# Patient Record
Sex: FEMALE | Race: WHITE | NOT HISPANIC OR LATINO | Employment: FULL TIME | URBAN - METROPOLITAN AREA
[De-identification: names, ages, dates, MRNs, and addresses within clinical notes are randomized per-mention and may not be internally consistent; named-entity substitution may affect disease eponyms.]

---

## 2017-03-15 RX ORDER — DESOGESTREL AND ETHINYL ESTRADIOL AND ETHINYL ESTRADIOL 21-5 (28)
KIT ORAL
Qty: 28 TABLET | Refills: 10 | Status: SHIPPED | OUTPATIENT
Start: 2017-03-15 | End: 2017-04-14 | Stop reason: SDUPTHER

## 2017-04-14 ENCOUNTER — OFFICE VISIT (OUTPATIENT)
Dept: OBSTETRICS AND GYNECOLOGY | Age: 27
End: 2017-04-14

## 2017-04-14 VITALS
WEIGHT: 102 LBS | SYSTOLIC BLOOD PRESSURE: 100 MMHG | BODY MASS INDEX: 18.77 KG/M2 | DIASTOLIC BLOOD PRESSURE: 60 MMHG | HEIGHT: 62 IN

## 2017-04-14 DIAGNOSIS — Z01.419 ENCOUNTER FOR GYNECOLOGICAL EXAMINATION WITHOUT ABNORMAL FINDING: Primary | ICD-10-CM

## 2017-04-14 DIAGNOSIS — B96.89 BV (BACTERIAL VAGINOSIS): ICD-10-CM

## 2017-04-14 DIAGNOSIS — N76.0 BV (BACTERIAL VAGINOSIS): ICD-10-CM

## 2017-04-14 PROCEDURE — 99395 PREV VISIT EST AGE 18-39: CPT | Performed by: OBSTETRICS & GYNECOLOGY

## 2017-04-14 RX ORDER — METRONIDAZOLE 7.5 MG/G
GEL VAGINAL WEEKLY
Qty: 70 G | Refills: 2 | Status: SHIPPED | OUTPATIENT
Start: 2017-04-14 | End: 2018-03-16 | Stop reason: SDUPTHER

## 2017-04-14 RX ORDER — DESOGESTREL AND ETHINYL ESTRADIOL 21-5 (28)
1 KIT ORAL DAILY
Qty: 28 TABLET | Refills: 12 | Status: SHIPPED | OUTPATIENT
Start: 2017-04-14 | End: 2018-09-25

## 2017-04-14 RX ORDER — TINIDAZOLE 500 MG/1
500 TABLET ORAL 2 TIMES DAILY
Qty: 10 TABLET | Refills: 1 | Status: SHIPPED | OUTPATIENT
Start: 2017-04-14 | End: 2017-04-19

## 2017-04-14 NOTE — PROGRESS NOTES
Routine Annual Visit    2017    Patient: Dina Chacon          MR#:[id number]      Chief Complaint   Patient presents with   • Annual Exam     PT HERE FOR ROUTINE ANNUAL CHECK, DOING WELL. LAST PAP  (NEG AND NEG HPV).       History of Present Illness    26 y.o. female  who presents for annual exam.   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  Obstetric History:  OB History      Para Term  AB TAB SAB Ectopic Multiple Living    3 2 2       1         Menstrual History:     Patient's last menstrual period was 2017 (exact date).       Sexual History:       ________________________________________  There is no problem list on file for this patient.      History reviewed. No pertinent past medical history.    Past Surgical History:   Procedure Laterality Date   • BREAST AUGMENTATION      enlargement   • BREAST SURGERY      augmentation   •  SECTION     • COLPOSCOPY     • LEEP     • NOSE SURGERY     • OTHER SURGICAL HISTORY      nasal septal deviation repair       History   Smoking Status   • Current Every Day Smoker   Smokeless Tobacco   • Not on file       has a current medication list which includes the following prescription(s): acetaminophen, cephalexin, desogestrel-ethinyl estradiol, metronidazole, and tinidazole.  ________________________________________    Current contraception: vasectomy  History of abnormal Pap smear: yes - last pap neg/neg  Family history of Breast cancer: no  Family history of uterine or ovarian cancer: no  Family History of colon cancer/colon polyps: no  History of abnormal mammogram: no      The following portions of the patient's history were reviewed and updated as appropriate: allergies,  "current medications, past family history, past medical history, past social history, past surgical history and problem list.    Review of Systems    Pertinent items are noted in HPI.     Objective   Physical Exam    /60  Ht 62\" (157.5 cm)  Wt 102 lb (46.3 kg)  LMP 03/30/2017 (Exact Date)  BMI 18.66 kg/m2   BP Readings from Last 3 Encounters:   04/14/17 100/60      Wt Readings from Last 3 Encounters:   04/14/17 102 lb (46.3 kg)      BMI: Estimated body mass index is 18.66 kg/(m^2) as calculated from the following:    Height as of this encounter: 62\" (157.5 cm).    Weight as of this encounter: 102 lb (46.3 kg).      General:   alert, appears stated age and cooperative   Abdomen: soft, non-tender, without masses or organomegaly   Breast: inspection negative, no nipple discharge or bleeding, no masses or nodularity palpable   Vulva: normal   Vagina: normal mucosa, minimal discharge with no odor noted   Cervix: no cervical motion tenderness and no lesions   Uterus: normal size, mobile or non-tender   Adnexa: normal adnexa and no mass, fullness, tenderness     Assessment:    1. Normal annual exam   Assessment     ICD-10-CM ICD-9-CM   1. Encounter for gynecological examination without abnormal finding Z01.419 V72.31   2. BV (bacterial vaginosis) N76.0 616.10    B96.89 041.9     Plan:    Plan     []  Mammogram request made  []  PAP done  []  Labs:   []  GC/Chl/TV  []  DEXA scan   []  Referral for colonoscopy:       Dina was seen today for annual exam.    Diagnoses and all orders for this visit:    Encounter for gynecological examination without abnormal finding    BV (bacterial vaginosis)  -     tinidazole (TINDAMAX) 500 MG tablet; Take 1 tablet by mouth 2 (Two) Times a Day for 5 days.  -     metroNIDAZOLE (METROGEL VAGINAL) 0.75 % vaginal gel; Insert  into the vagina 1 (One) Time Per Week. Use 1 applicator weekly for maintenance  -     NuSwab VG+    Other orders  -     desogestrel-ethinyl estradiol (VIORELE) " 0.15-0.02/0.01 MG (21/5) per tablet; Take 1 tablet by mouth Daily.

## 2017-04-19 LAB
A VAGINAE DNA VAG QL NAA+PROBE: ABNORMAL SCORE
BVAB2 DNA VAG QL NAA+PROBE: ABNORMAL SCORE
C ALBICANS DNA VAG QL NAA+PROBE: NEGATIVE
C GLABRATA DNA VAG QL NAA+PROBE: NEGATIVE
C TRACH RRNA SPEC QL NAA+PROBE: NEGATIVE
MEGA1 DNA VAG QL NAA+PROBE: ABNORMAL SCORE
N GONORRHOEA RRNA SPEC QL NAA+PROBE: NEGATIVE
T VAGINALIS RRNA SPEC QL NAA+PROBE: NEGATIVE

## 2017-04-24 ENCOUNTER — OFFICE VISIT (OUTPATIENT)
Dept: ORTHOPEDIC SURGERY | Facility: CLINIC | Age: 27
End: 2017-04-24

## 2017-04-24 VITALS — HEIGHT: 62 IN | TEMPERATURE: 99 F | BODY MASS INDEX: 18.95 KG/M2 | WEIGHT: 103 LBS

## 2017-04-24 DIAGNOSIS — M79.675 PAIN OF TOE OF LEFT FOOT: ICD-10-CM

## 2017-04-24 DIAGNOSIS — M77.52 BURSITIS OF LEFT FOOT: Primary | ICD-10-CM

## 2017-04-24 PROCEDURE — 73630 X-RAY EXAM OF FOOT: CPT | Performed by: ORTHOPAEDIC SURGERY

## 2017-04-24 PROCEDURE — 99203 OFFICE O/P NEW LOW 30 MIN: CPT | Performed by: ORTHOPAEDIC SURGERY

## 2017-04-24 NOTE — PROGRESS NOTES
"Patient:  Dina Chacon is a 26 y.o. female    Chief Complaint/ Reason for Visit:    Chief Complaint   Patient presents with   • Left Foot - Pain, Edema, Establish Care       HPI:  The patient presents today complaining of a painful \"bump\" on the lateral aspect of her left forefoot.  It has been present now for about 4 months.  The pain increases in response to wearing high-heeled dress shoes, and also throughout the day with weightbearing activities.  She does not recall any injury or trauma.  She is having pain that radiates from this area up on the dorsum of her left foot on the anterior lower left leg.  She is not had any discoloration or bruising.  She says that she has moderate pain on a constant basis in this area laterally.  She will episodically have a sharp, stabbing pain in this area.  Most of the time, the pain is aching.  It is alleviated by rest, taking her shoes off, and propping her feet up.  She has 3 relatively young children, and in addition to being employed full time, rarely has time to rest due to her home and work responsibilities.  She has no history of previously injuring the left foot.  She has no complaints with her right foot.      PMH:    Past Medical History:   Diagnosis Date   • Anemia during pregnancy    • Head ache    • Pain in joint, foot, left        PSH:    Past Surgical History:   Procedure Laterality Date   • BREAST AUGMENTATION      enlargement   • BREAST SURGERY      augmentation   •  SECTION     • COLPOSCOPY  2014   • LEEP  2014   • NOSE SURGERY     • OTHER SURGICAL HISTORY      nasal septal deviation repair       Social Hx:    Social History     Social History   • Marital status:      Spouse name: N/A   • Number of children: N/A   • Years of education: N/A     Occupational History   • Not on file.     Social History Main Topics   • Smoking status: Current Every Day Smoker     Packs/day: 0.50   • Smokeless tobacco: Not on file   • Alcohol use Yes      " "Comment: social drinker   • Drug use: Defer   • Sexual activity: Defer     Other Topics Concern   • Not on file     Social History Narrative       Family Hx:    Family History   Problem Relation Age of Onset   • Multiple sclerosis Other        Meds:    Current Outpatient Prescriptions:   •  acetaminophen (TYLENOL) 325 MG tablet, Take  by mouth., Disp: , Rfl:   •  Cephalexin (KEFLEX PO), Take  by mouth., Disp: , Rfl:   •  desogestrel-ethinyl estradiol (VIORELE) 0.15-0.02/0.01 MG (21/5) per tablet, Take 1 tablet by mouth Daily., Disp: 28 tablet, Rfl: 12  •  metroNIDAZOLE (METROGEL VAGINAL) 0.75 % vaginal gel, Insert  into the vagina 1 (One) Time Per Week. Use 1 applicator weekly for maintenance, Disp: 70 g, Rfl: 2    Allergies:    Allergies   Allergen Reactions   • Gelatin    • Sulfa Antibiotics Hives       ROS:  Review of Systems   Constitutional: Negative for chills, fever and unexpected weight change.   HENT: Negative for trouble swallowing and voice change.    Eyes: Negative for visual disturbance.   Respiratory: Negative for cough and shortness of breath.    Cardiovascular: Negative for chest pain and leg swelling.   Gastrointestinal: Negative for abdominal pain, nausea and vomiting.   Endocrine: Negative for cold intolerance and heat intolerance.   Genitourinary: Negative for difficulty urinating, frequency and urgency.   Musculoskeletal: Positive for arthralgias and joint swelling.   Skin: Negative for rash and wound.   Allergic/Immunologic: Negative for immunocompromised state.   Neurological: Positive for headaches. Negative for weakness and numbness.   Hematological: Does not bruise/bleed easily.   Psychiatric/Behavioral: Negative for dysphoric mood. The patient is not nervous/anxious.        Vitals:    04/24/17 0914   Temp: 99 °F (37.2 °C)   TempSrc: Temporal Artery    Weight: 103 lb (46.7 kg)   Height: 62\" (157.5 cm)       Physical Exam    The patient is awake, alert, and oriented ×3.  The patient is in no " acute distress.  Breathing is regular and unlabored with a respiratory rate of 14/m.  Extraocular movements and pupillary responses are symmetrically intact. Sclerae are anicteric.   Hearing is within normal limits.  Speech is within normal limits.  There is no jugular venous distention.    She's walking fairly well with a smooth symmetrical heel toe gait.  On inspection of her feet, she definitely has some fullness and enlargement over the lateral left forefoot surrounding the fifth metatarsal phalangeal joint.  She has some plantar callusing here, and also has plantar callusing beneath the first metatarsal head medially.  She has similar callused areas in her right forefoot as well.  She has tenderness over the lateral left forefoot over the fifth metatarsal head.  There is no cellulitis, no lymphangitis, and there is no popliteal lymphadenopathy.  She has strong active dorsiflexion and plantarflexion, inversion and eversion of the ankle foot and toes in the left lower extremity.  Her left calf is soft and nontender with no venous cord.  She has 1+ pedal pulses present symmetrically in both feet with a current, regular heart rate of about 78 bpm.  Skin and nails are unremarkable.  She has no tenderness anywhere in the left foot or ankle other than overlying the fifth metatarsal head laterally.  There is slight fluctuance here consistent with a bursa.        Radiology:X-rays: 3 views of the patient's left foot were ordered and reviewed today to assess her complaints of left lateral forefoot pain and swelling.  These images reveal soft tissue swelling only.  I do not see any bony or articular abnormalities with particular attention to the area of swelling in the left fifth metatarsal phalangeal joint.  I see no bony abnormalities elsewhere throughout the left foot.  Comparison images were not available.          Assessment:  1. Bursitis of left foot    - XR Foot 3+ View Left    2. Pain of toe of left  foot          Plan:  I discussed everything with the patient length.  We discussed the likelihood that this had all arisen from a single    Confining footwear and repeated pressure and confinement of the forefoot in this area.  There isn't really been no opportunity for her to alleviate pressure on this area.  I think she needs to avoid confining shoes, avoid high heels, and wear going to try a silicone pad.  I also think Achilles tendon stretching is important for both of her feet, and we discussed this as well.  She voiced understanding.  She was taught proper technique and program for Achilles tendon stretching, and we went over other footwear can serrations, relief padding, etc. and she voiced understanding.      Orders Placed This Encounter   Procedures   • XR Foot 3+ View Left     Order Specific Question:   Reason for Exam:     Answer:   iov lt. foot     Order Specific Question:   Patient Pregnant     Answer:   No

## 2017-07-06 ENCOUNTER — HOSPITAL ENCOUNTER (EMERGENCY)
Facility: HOSPITAL | Age: 27
Discharge: HOME | End: 2017-07-06
Attending: EMERGENCY MEDICINE | Admitting: EMERGENCY MEDICINE

## 2017-07-06 VITALS
SYSTOLIC BLOOD PRESSURE: 99 MMHG | BODY MASS INDEX: 18.4 KG/M2 | HEIGHT: 62 IN | DIASTOLIC BLOOD PRESSURE: 53 MMHG | HEART RATE: 93 BPM | OXYGEN SATURATION: 98 % | TEMPERATURE: 98 F | WEIGHT: 100 LBS | RESPIRATION RATE: 18 BRPM

## 2017-07-06 DIAGNOSIS — R11.2 NON-INTRACTABLE VOMITING WITH NAUSEA, UNSPECIFIED VOMITING TYPE: ICD-10-CM

## 2017-07-06 DIAGNOSIS — R51.9 HEADACHE, UNSPECIFIED HEADACHE TYPE: Primary | ICD-10-CM

## 2017-07-06 LAB
ALBUMIN SERPL-MCNC: 4.3 G/DL (ref 3.5–5.2)
ALBUMIN/GLOB SERPL: 1.6 G/DL
ALP SERPL-CCNC: 58 U/L (ref 39–117)
ALT SERPL W P-5'-P-CCNC: 80 U/L (ref 1–33)
ANION GAP SERPL CALCULATED.3IONS-SCNC: 16.3 MMOL/L
AST SERPL-CCNC: 53 U/L (ref 1–32)
BACTERIA UR QL AUTO: ABNORMAL /HPF
BASOPHILS # BLD AUTO: 0.01 10*3/MM3 (ref 0–0.2)
BASOPHILS NFR BLD AUTO: 0.1 % (ref 0–1.5)
BILIRUB SERPL-MCNC: 0.4 MG/DL (ref 0.1–1.2)
BILIRUB UR QL STRIP: NEGATIVE
BUN BLD-MCNC: 5 MG/DL (ref 6–20)
BUN/CREAT SERPL: 8.2 (ref 7–25)
CALCIUM SPEC-SCNC: 9 MG/DL (ref 8.6–10.5)
CHLORIDE SERPL-SCNC: 97 MMOL/L (ref 98–107)
CLARITY UR: ABNORMAL
CO2 SERPL-SCNC: 24.7 MMOL/L (ref 22–29)
COLOR UR: YELLOW
CREAT BLD-MCNC: 0.61 MG/DL (ref 0.57–1)
DEPRECATED RDW RBC AUTO: 42.1 FL (ref 37–54)
EOSINOPHIL # BLD AUTO: 0.05 10*3/MM3 (ref 0–0.7)
EOSINOPHIL NFR BLD AUTO: 0.5 % (ref 0.3–6.2)
ERYTHROCYTE [DISTWIDTH] IN BLOOD BY AUTOMATED COUNT: 11.7 % (ref 11.7–13)
GFR SERPL CREATININE-BSD FRML MDRD: 119 ML/MIN/1.73
GLOBULIN UR ELPH-MCNC: 2.7 GM/DL
GLUCOSE BLD-MCNC: 92 MG/DL (ref 65–99)
GLUCOSE UR STRIP-MCNC: NEGATIVE MG/DL
HCG SERPL QL: NEGATIVE
HCT VFR BLD AUTO: 40.8 % (ref 35.6–45.5)
HGB BLD-MCNC: 14.5 G/DL (ref 11.9–15.5)
HGB UR QL STRIP.AUTO: ABNORMAL
HYALINE CASTS UR QL AUTO: ABNORMAL /LPF
IMM GRANULOCYTES # BLD: 0 10*3/MM3 (ref 0–0.03)
IMM GRANULOCYTES NFR BLD: 0 % (ref 0–0.5)
KETONES UR QL STRIP: ABNORMAL
LEUKOCYTE ESTERASE UR QL STRIP.AUTO: NEGATIVE
LYMPHOCYTES # BLD AUTO: 1.51 10*3/MM3 (ref 0.9–4.8)
LYMPHOCYTES NFR BLD AUTO: 16.4 % (ref 19.6–45.3)
MCH RBC QN AUTO: 34.8 PG (ref 26.9–32)
MCHC RBC AUTO-ENTMCNC: 35.5 G/DL (ref 32.4–36.3)
MCV RBC AUTO: 97.8 FL (ref 80.5–98.2)
MONOCYTES # BLD AUTO: 0.48 10*3/MM3 (ref 0.2–1.2)
MONOCYTES NFR BLD AUTO: 5.2 % (ref 5–12)
NEUTROPHILS # BLD AUTO: 7.16 10*3/MM3 (ref 1.9–8.1)
NEUTROPHILS NFR BLD AUTO: 77.8 % (ref 42.7–76)
NITRITE UR QL STRIP: NEGATIVE
PH UR STRIP.AUTO: 7.5 [PH] (ref 5–8)
PLATELET # BLD AUTO: 229 10*3/MM3 (ref 140–500)
PMV BLD AUTO: 10.2 FL (ref 6–12)
POTASSIUM BLD-SCNC: 3.6 MMOL/L (ref 3.5–5.2)
PROT SERPL-MCNC: 7 G/DL (ref 6–8.5)
PROT UR QL STRIP: NEGATIVE
RBC # BLD AUTO: 4.17 10*6/MM3 (ref 3.9–5.2)
RBC # UR: ABNORMAL /HPF
REF LAB TEST METHOD: ABNORMAL
SODIUM BLD-SCNC: 138 MMOL/L (ref 136–145)
SP GR UR STRIP: 1.01 (ref 1–1.03)
SQUAMOUS #/AREA URNS HPF: ABNORMAL /HPF
UROBILINOGEN UR QL STRIP: ABNORMAL
WBC NRBC COR # BLD: 9.21 10*3/MM3 (ref 4.5–10.7)
WBC UR QL AUTO: ABNORMAL /HPF

## 2017-07-06 PROCEDURE — 84703 CHORIONIC GONADOTROPIN ASSAY: CPT | Performed by: EMERGENCY MEDICINE

## 2017-07-06 PROCEDURE — 96361 HYDRATE IV INFUSION ADD-ON: CPT

## 2017-07-06 PROCEDURE — 96375 TX/PRO/DX INJ NEW DRUG ADDON: CPT

## 2017-07-06 PROCEDURE — 25010000002 DIPHENHYDRAMINE PER 50 MG: Performed by: EMERGENCY MEDICINE

## 2017-07-06 PROCEDURE — 25010000002 MORPHINE PER 10 MG: Performed by: EMERGENCY MEDICINE

## 2017-07-06 PROCEDURE — 85025 COMPLETE CBC W/AUTO DIFF WBC: CPT | Performed by: EMERGENCY MEDICINE

## 2017-07-06 PROCEDURE — 81001 URINALYSIS AUTO W/SCOPE: CPT | Performed by: EMERGENCY MEDICINE

## 2017-07-06 PROCEDURE — 80053 COMPREHEN METABOLIC PANEL: CPT | Performed by: EMERGENCY MEDICINE

## 2017-07-06 PROCEDURE — 99284 EMERGENCY DEPT VISIT MOD MDM: CPT

## 2017-07-06 PROCEDURE — 96374 THER/PROPH/DIAG INJ IV PUSH: CPT

## 2017-07-06 PROCEDURE — 25010000002 METOCLOPRAMIDE PER 10 MG: Performed by: EMERGENCY MEDICINE

## 2017-07-06 RX ORDER — DIPHENHYDRAMINE HYDROCHLORIDE 50 MG/ML
25 INJECTION INTRAMUSCULAR; INTRAVENOUS ONCE
Status: COMPLETED | OUTPATIENT
Start: 2017-07-06 | End: 2017-07-06

## 2017-07-06 RX ORDER — METOCLOPRAMIDE HYDROCHLORIDE 5 MG/ML
10 INJECTION INTRAMUSCULAR; INTRAVENOUS ONCE
Status: COMPLETED | OUTPATIENT
Start: 2017-07-06 | End: 2017-07-06

## 2017-07-06 RX ORDER — ONDANSETRON 4 MG/1
4 TABLET, ORALLY DISINTEGRATING ORAL EVERY 8 HOURS PRN
Qty: 12 TABLET | Refills: 0 | Status: SHIPPED | OUTPATIENT
Start: 2017-07-06 | End: 2018-09-25

## 2017-07-06 RX ORDER — OXYCODONE HYDROCHLORIDE AND ACETAMINOPHEN 5; 325 MG/1; MG/1
1 TABLET ORAL EVERY 6 HOURS PRN
COMMUNITY
End: 2017-07-06

## 2017-07-06 RX ORDER — OXYCODONE HYDROCHLORIDE AND ACETAMINOPHEN 5; 325 MG/1; MG/1
1 TABLET ORAL EVERY 6 HOURS PRN
Qty: 12 TABLET | Refills: 0 | Status: SHIPPED | OUTPATIENT
Start: 2017-07-06 | End: 2018-09-25

## 2017-07-06 RX ORDER — ONDANSETRON 4 MG/1
4 TABLET, ORALLY DISINTEGRATING ORAL EVERY 8 HOURS PRN
COMMUNITY
End: 2017-07-06

## 2017-07-06 RX ORDER — SODIUM CHLORIDE 0.9 % (FLUSH) 0.9 %
10 SYRINGE (ML) INJECTION AS NEEDED
Status: DISCONTINUED | OUTPATIENT
Start: 2017-07-06 | End: 2017-07-06 | Stop reason: HOSPADM

## 2017-07-06 RX ORDER — MORPHINE SULFATE 2 MG/ML
2 INJECTION, SOLUTION INTRAMUSCULAR; INTRAVENOUS ONCE
Status: COMPLETED | OUTPATIENT
Start: 2017-07-06 | End: 2017-07-06

## 2017-07-06 RX ORDER — SODIUM CHLORIDE 9 MG/ML
125 INJECTION, SOLUTION INTRAVENOUS CONTINUOUS
Status: DISCONTINUED | OUTPATIENT
Start: 2017-07-06 | End: 2017-07-06 | Stop reason: HOSPADM

## 2017-07-06 RX ADMIN — MORPHINE SULFATE 2 MG: 2 INJECTION, SOLUTION INTRAMUSCULAR; INTRAVENOUS at 15:52

## 2017-07-06 RX ADMIN — METOCLOPRAMIDE 10 MG: 5 INJECTION, SOLUTION INTRAMUSCULAR; INTRAVENOUS at 15:52

## 2017-07-06 RX ADMIN — SODIUM CHLORIDE 1000 ML: 9 INJECTION, SOLUTION INTRAVENOUS at 15:52

## 2017-07-06 RX ADMIN — DIPHENHYDRAMINE HYDROCHLORIDE 25 MG: 50 INJECTION, SOLUTION INTRAMUSCULAR; INTRAVENOUS at 15:52

## 2017-07-06 NOTE — DISCHARGE INSTRUCTIONS
You are advised to follow closely with Dr. Qureshi and primary care provider of your choice in 2-3 days for recheck, final results of lab work and imaging testing, and further testing/treatment as needed.  Drink at least 8 glasses of fluids daily.  Advance diet as tolerated    Please return to the emergency department immediately with chest pain different than usual for you, shortness of air, abdominal pain, persistent vomiting/fever, blood in emesis or stool, lightheadedness/fainting, problems with speech, one sided weakness/numbness, new incontinence, problems with vision, altered mental status,  or for worsening of symptoms or other concerns.

## 2017-07-06 NOTE — ED NOTES
Pt to ED for onset of headache, pressure behind eyes and vomiting at 1200 today, states had rhinoplasty on Monday. Denies dizziness     Kary Casper RN  07/06/17 6478

## 2017-07-06 NOTE — ED PROVIDER NOTES
" EMERGENCY DEPARTMENT ENCOUNTER    CHIEF COMPLAINT  Chief Complaint: HA  History given by: Patient  History limited by: Nothing  Room Number:   PMD: No Known Provider      HPI:  Pt is a 26 y.o. female who presents complaining of HA onset earlier today. Pt reports nausea, vomiting X 3-4, and abd pain. Pt denies fever, hematuria, vaginal discharge, cough, nosebleeds, CP, and SOA. The pt is 3 days status post septorhinoplasty. The pt has been taking Percocet and Zofran for the pain, but was unable to today due to vomiting. Pt states she has less than one percocet left.  The pt took 2 suppositories last night, but she has not had a BM.    Duration: Since earlier todaoy  Onset: Gradual  Timing: Constant  Location: Head  Radiation: None  Quality: \"Pain\"  Intensity/Severity: Moderate  Progression: Unchanged  Associated Symptoms: Nausea, vomiting X 3-4, and constipation  Aggravating Factors: Nothing  Alleviating Factors: Nothing  Previous Episodes: None  Treatment before arrival: Nothing    PAST MEDICAL HISTORY  Active Ambulatory Problems     Diagnosis Date Noted   • Bursitis of left foot 2017   • Pain of toe of left foot 2017     Resolved Ambulatory Problems     Diagnosis Date Noted   • No Resolved Ambulatory Problems     Past Medical History:   Diagnosis Date   • Head ache    • Pain in joint, foot, left        PAST SURGICAL HISTORY  Past Surgical History:   Procedure Laterality Date   • BREAST AUGMENTATION      enlargement   • BREAST SURGERY      augmentation   •  SECTION     • COLPOSCOPY     • LEEP  2014   • NOSE SURGERY     • OTHER SURGICAL HISTORY      nasal septal deviation repair       FAMILY HISTORY  Family History   Problem Relation Age of Onset   • Multiple sclerosis Other        SOCIAL HISTORY  Social History     Social History   • Marital status:      Spouse name: N/A   • Number of children: N/A   • Years of education: N/A     Occupational History   • Not on file. "     Social History Main Topics   • Smoking status: Current Every Day Smoker     Packs/day: 0.50   • Smokeless tobacco: Not on file   • Alcohol use Yes      Comment: social drinker   • Drug use: Defer   • Sexual activity: Defer     Other Topics Concern   • Not on file     Social History Narrative   • No narrative on file       ALLERGIES  Gelatin and Sulfa antibiotics    REVIEW OF SYSTEMS  Review of Systems   Constitutional: Negative for chills and fever.   HENT: Negative for sore throat and trouble swallowing.    Eyes: Negative for visual disturbance.   Respiratory: Negative for cough and shortness of breath.    Cardiovascular: Negative for chest pain, palpitations and leg swelling.   Gastrointestinal: Positive for abdominal pain, nausea and vomiting. Negative for diarrhea.   Endocrine: Negative.    Genitourinary: Negative for decreased urine volume, dysuria and frequency.   Musculoskeletal: Negative for neck pain.   Skin: Negative for rash.   Allergic/Immunologic: Negative.    Neurological: Positive for headaches. Negative for syncope, weakness and numbness.   Hematological: Negative.    Psychiatric/Behavioral: Negative.    All other systems reviewed and are negative.      PHYSICAL EXAM  ED Triage Vitals   Temp Heart Rate Resp BP SpO2   07/06/17 1435 07/06/17 1435 07/06/17 1435 07/06/17 1439 07/06/17 1435   98 °F (36.7 °C) 75 16 116/77 96 %      Temp src Heart Rate Source Patient Position BP Location FiO2 (%)   07/06/17 1435 07/06/17 1435 07/06/17 1439 07/06/17 1439 --   Tympanic Monitor Sitting Left arm        Physical Exam   Constitutional: She is oriented to person, place, and time.  Non-toxic appearance. She appears distressed (mild).   HENT:   Head: Normocephalic.   Steri strip in place over bridge of nose , dried blood bilat nares,  Small drop of fresh blood in right nare - no active bleeding   Eyes: EOM are normal.   Neck: Normal range of motion. Neck supple.   Cardiovascular: Regular rhythm, normal heart  sounds and intact distal pulses.  Tachycardia present.    No murmur heard.  Pulses:       Posterior tibial pulses are 2+ on the right side, and 2+ on the left side.   Pulmonary/Chest: Effort normal and breath sounds normal. No respiratory distress.   Abdominal: Soft. Bowel sounds are normal. There is no tenderness. There is no rebound and no guarding.   Musculoskeletal: Normal range of motion. She exhibits no edema.   Neurological: She is alert and oriented to person, place, and time.   Skin: Skin is warm and dry.   The pt has Steri-strips post surgical to the nose with a small amount of dried blood laterally.     Psychiatric: Affect normal.   Nursing note and vitals reviewed.      LAB RESULTS  Lab Results (last 24 hours)     Procedure Component Value Units Date/Time    CBC & Differential [[id number]] Collected:  07/06/17 1550    Specimen:  Blood Updated:  07/06/17 1608    Narrative:       The following orders were created for panel order CBC & Differential.  Procedure                               Abnormality         Status                     ---------                               -----------         ------                     CBC Auto Differential[[id number]]         Abnormal            Final result                 Please view results for these tests on the individual orders.    Comprehensive Metabolic Panel [[id number]]  (Abnormal) Collected:  07/06/17 1550    Specimen:  Blood Updated:  07/06/17 1628     Glucose 92 mg/dL      BUN 5 (L) mg/dL      Creatinine 0.61 mg/dL      Sodium 138 mmol/L      Potassium 3.6 mmol/L      Chloride 97 (L) mmol/L      CO2 24.7 mmol/L      Calcium 9.0 mg/dL      Total Protein 7.0 g/dL      Albumin 4.30 g/dL      ALT (SGPT) 80 (H) U/L      AST (SGOT) 53 (H) U/L      Alkaline Phosphatase 58 U/L      Total Bilirubin 0.4 mg/dL      eGFR Non African Amer 119 mL/min/1.73      Globulin 2.7 gm/dL      A/G Ratio 1.6 g/dL      BUN/Creatinine Ratio 8.2     Anion Gap 16.3 mmol/L     Urinalysis With  / Culture If Indicated [[id number]]  (Abnormal) Collected:  07/06/17 1550    Specimen:  Urine from Urine, Clean Catch Updated:  07/06/17 1609     Color, UA Yellow     Appearance, UA Cloudy (A)     pH, UA 7.5     Specific Gravity, UA 1.007     Glucose, UA Negative     Ketones, UA 40 mg/dL (2+) (A)     Bilirubin, UA Negative     Blood, UA Trace (A)     Protein, UA Negative     Leuk Esterase, UA Negative     Nitrite, UA Negative     Urobilinogen, UA 0.2 E.U./dL    hCG, Serum, Qualitative [[id number]]  (Normal) Collected:  07/06/17 1550    Specimen:  Blood Updated:  07/06/17 1626     HCG Qualitative Negative    CBC Auto Differential [[id number]]  (Abnormal) Collected:  07/06/17 1550    Specimen:  Blood Updated:  07/06/17 1608     WBC 9.21 10*3/mm3      RBC 4.17 10*6/mm3      Hemoglobin 14.5 g/dL      Hematocrit 40.8 %      MCV 97.8 fL      MCH 34.8 (H) pg      MCHC 35.5 g/dL      RDW 11.7 %      RDW-SD 42.1 fl      MPV 10.2 fL      Platelets 229 10*3/mm3      Neutrophil % 77.8 (H) %      Lymphocyte % 16.4 (L) %      Monocyte % 5.2 %      Eosinophil % 0.5 %      Basophil % 0.1 %      Immature Grans % 0.0 %      Neutrophils, Absolute 7.16 10*3/mm3      Lymphocytes, Absolute 1.51 10*3/mm3      Monocytes, Absolute 0.48 10*3/mm3      Eosinophils, Absolute 0.05 10*3/mm3      Basophils, Absolute 0.01 10*3/mm3      Immature Grans, Absolute 0.00 10*3/mm3     Urinalysis, Microscopic Only [[id number]]  (Abnormal) Collected:  07/06/17 1550    Specimen:  Urine from Urine, Clean Catch Updated:  07/06/17 1609     RBC, UA 0-2 /HPF      WBC, UA 0-2 /HPF      Bacteria, UA 1+ (A) /HPF      Squamous Epithelial Cells, UA 7-12 (A) /HPF      Hyaline Casts, UA 0-2 /LPF      Methodology Automated Microscopy          I ordered the above labs and reviewed the results    PROCEDURES  Procedures      PROGRESS AND CONSULTS  ED Course     1533  Labs ordered for further evaluation. Benadryl, Reglan, and IVFs ordered.    1735  BP- 109/62 HR- 86 Temp- 98 °F  (36.7 °C) (Tympanic) O2 sat- 99%    Rechecked pt, her pain has improved after receiving the Benadryl and Reglan. Tolerating po well.  Discussed the lab results with the pt. Discussed the plan to discharge the pt due to her negative work up with pain medication and nausea medication. I advised the pt to drink plenty of water with frequent snacks to stay hydrated. The pt understands and agrees with the plan.      MEDICAL DECISION MAKING  Results were reviewed/discussed with the patient and they were also made aware of online access. Pt also made aware that some labs, such as cultures, will not be resulted during ER visit and follow up with PMD is necessary.     MDM  Number of Diagnoses or Management Options  Headache, unspecified headache type (Resolved):   Intractable vomiting with nausea, unspecified vomiting type:      Amount and/or Complexity of Data Reviewed  Clinical lab tests: ordered and reviewed (BUN 5(L))    Patient Progress  Patient progress: stable         DIAGNOSIS  Final diagnoses:   Headache, unspecified headache type (Resolved)   Intractable vomiting with nausea, unspecified vomiting type       DISPOSITION  DISCHARGE    Patient discharged in stable condition.    Reviewed implications of results, diagnosis, meds, responsibility to follow up, warning signs and symptoms of possible worsening, potential complications and reasons to return to ER.    Patient/Family voiced understanding of above instructions.    Discussed plan for discharge, as there is no emergent indication for admission.  Pt/family is agreeable and understands need for follow up and repeat testing.  Pt is aware that discharge does not mean that nothing is wrong but it indicates no emergency is present that requires admission and they must continue care with follow-up as given below or physician of their choice.     FOLLOW-UP  Valentin Qureshi MD  [address] [place] [address], [address] [address] [phone]    Schedule an  appointment as soon as possible for a visit in 3 days  EVEN IF WELL    [facility] PHYSICAN REFERRAL SERVICE  [facility] [id number] [phone]  Schedule an appointment as soon as possible for a visit in 3 days  EVEN IF WELL         Medication List      Changed          oxyCODONE-acetaminophen 5-325 MG per tablet   Commonly known as:  PERCOCET   Take 1 tablet by mouth Every 6 (Six) Hours As Needed for Moderate Pain   (4-6).   What changed:  reasons to take this         Stop          acetaminophen 325 MG tablet   Commonly known as:  TYLENOL       desogestrel-ethinyl estradiol 0.15-0.02/0.01 MG (21/5) per tablet   Commonly known as:  VIORELE       KEFLEX PO       metroNIDAZOLE 0.75 % vaginal gel   Commonly known as:  METROGEL VAGINAL               Latest Documented Vital Signs:  As of 5:39 PM  BP- 109/62 HR- 86 Temp- 98 °F (36.7 °C) (Tympanic) O2 sat- 99%    --  Documentation assistance provided by nora Clemente for Dr. Padilla.  Information recorded by the scrluis albertoe was done at my direction and has been verified and validated by me.     Juliocesar Clemente  07/06/17 5768       Dinorah Padilla MD  07/09/17 0355

## 2017-09-09 DIAGNOSIS — N76.0 BV (BACTERIAL VAGINOSIS): ICD-10-CM

## 2017-09-09 DIAGNOSIS — B96.89 BV (BACTERIAL VAGINOSIS): ICD-10-CM

## 2017-09-11 RX ORDER — METRONIDAZOLE 500 MG/1
TABLET ORAL
Qty: 14 TABLET | Refills: 0 | Status: SHIPPED | OUTPATIENT
Start: 2017-09-11 | End: 2018-03-16 | Stop reason: SDUPTHER

## 2018-03-16 ENCOUNTER — TELEPHONE (OUTPATIENT)
Dept: OBSTETRICS AND GYNECOLOGY | Age: 28
End: 2018-03-16

## 2018-03-16 DIAGNOSIS — B96.89 BV (BACTERIAL VAGINOSIS): ICD-10-CM

## 2018-03-16 DIAGNOSIS — N76.0 BV (BACTERIAL VAGINOSIS): ICD-10-CM

## 2018-03-16 RX ORDER — METRONIDAZOLE 7.5 MG/G
GEL VAGINAL EVERY 12 HOURS SCHEDULED
Qty: 70 G | Refills: 2 | Status: SHIPPED | OUTPATIENT
Start: 2018-03-16 | End: 2018-09-25

## 2018-03-16 RX ORDER — METRONIDAZOLE 500 MG/1
500 TABLET ORAL 2 TIMES DAILY
Qty: 14 TABLET | Refills: 0 | Status: SHIPPED | OUTPATIENT
Start: 2018-03-16 | End: 2018-09-25

## 2018-03-16 NOTE — TELEPHONE ENCOUNTER
I refilled both, she is due for her annual in April, have her schedule appt if not already scheduled

## 2018-03-16 NOTE — TELEPHONE ENCOUNTER
"PT CALLING WITH BV INFECTION, GETS THESE FREQUENTLY. SHE WOULD LIKE FLAGYL CALLED IN ALONG WITH \"OINMENT OR GEL\" THAT DR HIGGINS HAS PRESCRIBED BEFORE TO TAKE ALONG WITH [facility]. STATES IT SHOULD BE IN FILE.  "

## 2018-09-25 ENCOUNTER — OFFICE VISIT (OUTPATIENT)
Dept: OBSTETRICS AND GYNECOLOGY | Age: 28
End: 2018-09-25

## 2018-09-25 VITALS
BODY MASS INDEX: 20.24 KG/M2 | HEIGHT: 62 IN | WEIGHT: 110 LBS | SYSTOLIC BLOOD PRESSURE: 106 MMHG | DIASTOLIC BLOOD PRESSURE: 66 MMHG

## 2018-09-25 DIAGNOSIS — Z01.419 ENCOUNTER FOR GYNECOLOGICAL EXAMINATION WITHOUT ABNORMAL FINDING: Primary | ICD-10-CM

## 2018-09-25 DIAGNOSIS — Z12.4 SCREENING FOR CERVICAL CANCER: ICD-10-CM

## 2018-09-25 DIAGNOSIS — N76.0 RECURRENT VAGINITIS: ICD-10-CM

## 2018-09-25 PROCEDURE — 99395 PREV VISIT EST AGE 18-39: CPT | Performed by: OBSTETRICS & GYNECOLOGY

## 2018-09-25 RX ORDER — METRONIDAZOLE 7.5 MG/G
GEL VAGINAL 2 TIMES DAILY
Qty: 70 G | Refills: 2 | Status: SHIPPED | OUTPATIENT
Start: 2018-09-25 | End: 2019-01-10

## 2018-09-25 RX ORDER — METRONIDAZOLE 500 MG/1
500 TABLET ORAL 2 TIMES DAILY
Qty: 14 TABLET | Refills: 2 | Status: SHIPPED | OUTPATIENT
Start: 2018-09-25 | End: 2018-10-02

## 2018-09-25 NOTE — PROGRESS NOTES
Routine Annual Visit    2018    Patient: Dina Chacon          MR#:[id number]      Chief Complaint   Patient presents with   • Annual Exam     PT HERE FOR ROUTINE AE, SHE WOULD LIKE TO DISCUSS WHY SHE GETS FREQUENT BV INFECTIONS. LAST PAP 2016 (NEG). VAS FOR BC.       History of Present Illness    28 y.o. female  who presents for annual exam.   Reg menses  Still with frequent BV and pt is tearful about it  Discussed with pt  Will try probiotics and preventative metrogel  Gave refills  Reassured pt with discussion  Kids are 9,7,3  VAS          Patient's last menstrual period was 2018.  Obstetric History:  OB History      Para Term  AB Living    3 2 2     1    SAB TAB Ectopic Molar Multiple Live Births              1         Menstrual History:     Patient's last menstrual period was 2018.       Sexual History:       ________________________________________  Patient Active Problem List   Diagnosis   • Bursitis of left foot   • Pain of toe of left foot       Past Medical History:   Diagnosis Date   • Head ache    • Pain in joint, foot, left        Past Surgical History:   Procedure Laterality Date   • BREAST AUGMENTATION      enlargement   • BREAST SURGERY      augmentation   •  SECTION     • COLPOSCOPY     • LEEP     • NOSE SURGERY     • OTHER SURGICAL HISTORY      nasal septal deviation repair       History   Smoking Status   • Current Every Day Smoker   • Packs/day: 0.50   Smokeless Tobacco   • Not on file       has a current medication list which includes the following prescription(s): metronidazole and metronidazole.  ________________________________________    Current contraception: vasectomy  History of abnormal Pap smear: no  Family history of Breast cancer: no  Family history of uterine or ovarian cancer: no  Family History of colon cancer/colon polyps: no  History of abnormal mammogram: no      The following portions of the patient's history were  "reviewed and updated as appropriate: allergies, current medications, past family history, past medical history, past social history, past surgical history and problem list.    Review of Systems    Pertinent items are noted in HPI.     Objective   Physical Exam    /66   Ht 157.5 cm (62\")   Wt 49.9 kg (110 lb)   LMP 09/04/2018   BMI 20.12 kg/m²    BP Readings from Last 3 Encounters:   09/25/18 106/66   07/06/17 99/53   04/14/17 100/60      Wt Readings from Last 3 Encounters:   09/25/18 49.9 kg (110 lb)   07/06/17 45.4 kg (100 lb)   04/24/17 46.7 kg (103 lb)      BMI: Estimated body mass index is 20.12 kg/m² as calculated from the following:    Height as of this encounter: 157.5 cm (62\").    Weight as of this encounter: 49.9 kg (110 lb).      General:   alert, appears stated age and cooperative   Abdomen: soft, non-tender, without masses or organomegaly   Breast: inspection negative, no nipple discharge or bleeding, no masses or nodularity palpable   Vulva: normal   Vagina: normal mucosa   Cervix: no cervical motion tenderness and no lesions   Uterus: normal size, mobile or non-tender   Adnexa: no mass, fullness, tenderness     Assessment:    1. Normal annual exam   Assessment     ICD-10-CM ICD-9-CM   1. Encounter for gynecological examination without abnormal finding Z01.419 V72.31   2. Screening for cervical cancer Z12.4 V76.2   3. Recurrent vaginitis N76.0 616.10     Plan:    Plan     []  Mammogram request made  [x]  PAP done  []  Labs:   []  GC/Chl/TV  []  DEXA scan   []  Referral for colonoscopy:       Dina was seen today for annual exam.    Diagnoses and all orders for this visit:    Encounter for gynecological examination without abnormal finding    Screening for cervical cancer    Recurrent vaginitis    Other orders  -     metroNIDAZOLE (FLAGYL) 500 MG tablet; Take 1 tablet by mouth 2 (Two) Times a Day for 7 days.  -     metroNIDAZOLE (METROGEL VAGINAL) 0.75 % vaginal gel; Insert  into the vagina 2 " (Two) Times a Day.            Counseling:  --Nutrition: Stressed importance of moderation and caloric balance, stressed fresh fruit and vegetables  --Exercise: Stressed the importance of regular exercise. 3-5 times weekly       --Discussed pap smear screening recommendations

## 2018-10-02 LAB
CONV .: ABNORMAL
CYTOLOGIST CVX/VAG CYTO: ABNORMAL
CYTOLOGY CVX/VAG DOC THIN PREP: ABNORMAL
DX ICD CODE: ABNORMAL
DX ICD CODE: ABNORMAL
HIV 1 & 2 AB SER-IMP: ABNORMAL
HPV I/H RISK 4 DNA CVX QL PROBE+SIG AMP: NEGATIVE
OTHER STN SPEC: ABNORMAL
PATH REPORT.FINAL DX SPEC: ABNORMAL
PATHOLOGIST CVX/VAG CYTO: ABNORMAL
RECOM F/U CVX/VAG CYTO: ABNORMAL
STAT OF ADQ CVX/VAG CYTO-IMP: ABNORMAL

## 2019-01-10 ENCOUNTER — OFFICE VISIT (OUTPATIENT)
Dept: INTERNAL MEDICINE | Facility: CLINIC | Age: 29
End: 2019-01-10

## 2019-01-10 VITALS
SYSTOLIC BLOOD PRESSURE: 100 MMHG | DIASTOLIC BLOOD PRESSURE: 68 MMHG | HEART RATE: 85 BPM | BODY MASS INDEX: 20.41 KG/M2 | WEIGHT: 110.9 LBS | OXYGEN SATURATION: 97 % | HEIGHT: 62 IN

## 2019-01-10 DIAGNOSIS — Z13.1 SCREENING FOR DIABETES MELLITUS: ICD-10-CM

## 2019-01-10 DIAGNOSIS — Z13.29 SCREENING FOR THYROID DISORDER: ICD-10-CM

## 2019-01-10 DIAGNOSIS — Z00.00 WELL WOMAN EXAM (NO GYNECOLOGICAL EXAM): Primary | ICD-10-CM

## 2019-01-10 DIAGNOSIS — Z13.220 SCREENING FOR LIPID DISORDERS: ICD-10-CM

## 2019-01-10 PROCEDURE — 99385 PREV VISIT NEW AGE 18-39: CPT | Performed by: FAMILY MEDICINE

## 2019-01-10 NOTE — PROGRESS NOTES
Subjective   Dina Chacon is a 28 y.o. female and is here for a comprehensive physical exam. The patient reports no problems.    Pt is UTD with annual gyn exam.    Do you take any herbs or supplements that were not prescribed by a doctor? no      Social History:   Social History     Socioeconomic History   • Marital status:      Spouse name: Not on file   • Number of children: Not on file   • Years of education: Not on file   • Highest education level: Not on file   Social Needs   • Financial resource strain: Not on file   • Food insecurity - worry: Not on file   • Food insecurity - inability: Not on file   • Transportation needs - medical: Not on file   • Transportation needs - non-medical: Not on file   Occupational History   • Not on file   Tobacco Use   • Smoking status: Former Smoker     Packs/day: 0.50   • Smokeless tobacco: Never Used   Substance and Sexual Activity   • Alcohol use: Yes     Comment: social drinker   • Drug use: Defer   • Sexual activity: Defer   Other Topics Concern   • Not on file   Social History Narrative   • Not on file       Family History:   Family History   Problem Relation Age of Onset   • Multiple sclerosis Other        Past Medical History:   Past Medical History:   Diagnosis Date   • Head ache    • Pain in joint, foot, left            Review of Systems    Review of Systems   Constitutional: Negative for chills and fever.   HENT: Negative for congestion, rhinorrhea, sinus pain and sore throat.    Eyes: Negative for photophobia and visual disturbance.   Respiratory: Negative for cough, chest tightness and shortness of breath.    Cardiovascular: Negative for chest pain and palpitations.   Gastrointestinal: Negative for diarrhea, nausea and vomiting.   Genitourinary: Negative for dysuria, frequency and urgency.   Skin: Negative for rash and wound.   Neurological: Negative for dizziness and syncope.   Psychiatric/Behavioral: Negative for behavioral problems and confusion.        Objective   Physical Exam   Constitutional: She is oriented to person, place, and time. She appears well-developed and well-nourished.   HENT:   Head: Normocephalic and atraumatic.   Right Ear: External ear normal.   Left Ear: External ear normal.   Mouth/Throat: Oropharynx is clear and moist.   Eyes: EOM are normal.   Neck: Normal range of motion. Neck supple.   Cardiovascular: Normal rate, regular rhythm and normal heart sounds.   Pulmonary/Chest: Effort normal and breath sounds normal. No respiratory distress.   Abdominal: Soft. There is no tenderness. There is no guarding.   Musculoskeletal: Normal range of motion.   Lymphadenopathy:     She has no cervical adenopathy.   Neurological: She is alert and oriented to person, place, and time.   Skin: Skin is warm.   Psychiatric: She has a normal mood and affect. Her behavior is normal.   Nursing note and vitals reviewed.      Medications: No current outpatient medications on file.       Assessment/Plan   Healthy female exam.      1. Healthcare Maintenance:  2. Patient Counseling:  --Nutrition: Stressed importance of moderation in sodium/caffeine intake, saturated fat and cholesterol, caloric balance, sufficient intake of fresh fruits, vegetables, fiber, calcium and vit D  --Exercise: Recommended 30 mins of exercise daily.   --Immunizations reviewed.      Diagnoses and all orders for this visit:    Well woman exam (no gynecological exam)  -     Comprehensive Metabolic Panel  -     CBC & Differential    Screening for thyroid disorder  -     Thyroid Panel With TSH    Screening for diabetes mellitus  -     Hemoglobin A1c    Screening for lipid disorders  -     Lipid Panel With LDL / HDL Ratio        No Follow-up on file.             Dictated utilizing Dragon Voice Recognition Software

## 2019-01-11 LAB
ALBUMIN SERPL-MCNC: 4.7 G/DL (ref 3.5–5.2)
ALBUMIN/GLOB SERPL: 1.9 G/DL
ALP SERPL-CCNC: 56 U/L (ref 39–117)
ALT SERPL-CCNC: 19 U/L (ref 1–33)
AST SERPL-CCNC: 25 U/L (ref 1–32)
BASOPHILS # BLD AUTO: 0.02 10*3/MM3 (ref 0–0.2)
BASOPHILS NFR BLD AUTO: 0.4 % (ref 0–1.5)
BILIRUB SERPL-MCNC: 0.5 MG/DL (ref 0.1–1.2)
BUN SERPL-MCNC: 7 MG/DL (ref 6–20)
BUN/CREAT SERPL: 12.5 (ref 7–25)
CALCIUM SERPL-MCNC: 9.8 MG/DL (ref 8.6–10.5)
CHLORIDE SERPL-SCNC: 103 MMOL/L (ref 98–107)
CHOLEST SERPL-MCNC: 167 MG/DL (ref 0–200)
CO2 SERPL-SCNC: 24.9 MMOL/L (ref 22–29)
CREAT SERPL-MCNC: 0.56 MG/DL (ref 0.57–1)
EOSINOPHIL # BLD AUTO: 0.07 10*3/MM3 (ref 0–0.7)
EOSINOPHIL NFR BLD AUTO: 1.5 % (ref 0.3–6.2)
ERYTHROCYTE [DISTWIDTH] IN BLOOD BY AUTOMATED COUNT: 12.3 % (ref 11.7–13)
FT4I SERPL CALC-MCNC: 1.7 (ref 1.2–4.9)
GLOBULIN SER CALC-MCNC: 2.5 GM/DL
GLUCOSE SERPL-MCNC: 89 MG/DL (ref 65–99)
HBA1C MFR BLD: 4.74 % (ref 4.8–5.6)
HCT VFR BLD AUTO: 41.9 % (ref 35.6–45.5)
HDLC SERPL-MCNC: 69 MG/DL (ref 40–60)
HGB BLD-MCNC: 14.1 G/DL (ref 11.9–15.5)
IMM GRANULOCYTES # BLD AUTO: 0 10*3/MM3 (ref 0–0.03)
IMM GRANULOCYTES NFR BLD AUTO: 0 % (ref 0–0.5)
LDLC SERPL CALC-MCNC: 88 MG/DL (ref 0–100)
LDLC/HDLC SERPL: 1.27 {RATIO}
LYMPHOCYTES # BLD AUTO: 1.92 10*3/MM3 (ref 0.9–4.8)
LYMPHOCYTES NFR BLD AUTO: 40.2 % (ref 19.6–45.3)
MCH RBC QN AUTO: 34.1 PG (ref 26.9–32)
MCHC RBC AUTO-ENTMCNC: 33.7 G/DL (ref 32.4–36.3)
MCV RBC AUTO: 101.5 FL (ref 80.5–98.2)
MONOCYTES # BLD AUTO: 0.29 10*3/MM3 (ref 0.2–1.2)
MONOCYTES NFR BLD AUTO: 6.1 % (ref 5–12)
NEUTROPHILS # BLD AUTO: 2.48 10*3/MM3 (ref 1.9–8.1)
NEUTROPHILS NFR BLD AUTO: 51.8 % (ref 42.7–76)
PLATELET # BLD AUTO: 281 10*3/MM3 (ref 140–500)
POTASSIUM SERPL-SCNC: 4.1 MMOL/L (ref 3.5–5.2)
PROT SERPL-MCNC: 7.2 G/DL (ref 6–8.5)
RBC # BLD AUTO: 4.13 10*6/MM3 (ref 3.9–5.2)
SODIUM SERPL-SCNC: 143 MMOL/L (ref 136–145)
T3RU NFR SERPL: 24 % (ref 24–39)
T4 SERPL-MCNC: 7.1 UG/DL (ref 4.5–12)
TRIGL SERPL-MCNC: 52 MG/DL (ref 0–150)
TSH SERPL DL<=0.005 MIU/L-ACNC: 1.6 UIU/ML (ref 0.45–4.5)
VLDLC SERPL CALC-MCNC: 10.4 MG/DL (ref 5–40)
WBC # BLD AUTO: 4.78 10*3/MM3 (ref 4.5–10.7)

## 2019-01-12 NOTE — PROGRESS NOTES
Please inform the patient of the following abnormal results.  Macrocytosis without anemia. Check folate and b12 levels.

## 2019-01-15 ENCOUNTER — TELEPHONE (OUTPATIENT)
Dept: INTERNAL MEDICINE | Facility: CLINIC | Age: 29
End: 2019-01-15

## 2019-01-15 DIAGNOSIS — D75.89 MACROCYTOSIS WITHOUT ANEMIA: ICD-10-CM

## 2019-01-15 DIAGNOSIS — Z83.2 FAMILY HISTORY OF SICKLE CELL TRAIT: ICD-10-CM

## 2019-01-15 DIAGNOSIS — D75.89 MACROCYTOSIS WITHOUT ANEMIA: Primary | ICD-10-CM

## 2019-01-15 NOTE — TELEPHONE ENCOUNTER
LVM- patient notified (ok per HIPAA). Patient advised to contact office if they have any questions. Patient advised to contact office to schedule a nonfasting lab appointment at her convenience. Letter also mailed to patient with results. Lab orders put into Epic.

## 2019-01-15 NOTE — TELEPHONE ENCOUNTER
----- Message from Juan Jose Mcgee MD sent at 1/11/2019  9:48 PM EST -----  Please inform the patient of the following abnormal results.  Macrocytosis without anemia. Check folate and b12 levels.

## 2019-01-15 NOTE — TELEPHONE ENCOUNTER
Patient returned call stating that she wanted to let Dr. Mcgee know that the sickle cell trait does run in her family and she didn't know if this could cause the abnormal lab results. She would also like to be checked for the sickle cell trait. Per Dr. Mcgee most likely not related to abnormal lab results, okay to add hgb electrophoresis to order for labs. Lab appointment scheduled for tomorrow.

## 2019-01-19 LAB
FOLATE SERPL-MCNC: 14.82 NG/ML (ref 4.78–24.2)
HGB A MFR BLD: 96.5 % (ref 96.4–98.8)
HGB A2 MFR BLD COLUMN CHROM: 2.5 % (ref 1.8–3.2)
HGB C MFR BLD: 0 %
HGB F MFR BLD: 1 % (ref 0–2)
HGB FRACT BLD-IMP: NORMAL
HGB S BLD QL SOLY: NEGATIVE
HGB S MFR BLD: 0 %
Lab: ABNORMAL
METHYLMALONATE SERPL-SCNC: 407 NMOL/L (ref 0–378)
VIT B12 SERPL-MCNC: 169 PG/ML (ref 211–946)

## 2019-01-19 NOTE — PROGRESS NOTES
Please inform the patient of the following abnormal results.  Has vitamin b12 def. Will need monthly vitamin b12 injections. Send in vitamin b12 prescription. If she would like first injection in the office, we can do that as well.

## 2019-01-21 ENCOUNTER — TELEPHONE (OUTPATIENT)
Dept: INTERNAL MEDICINE | Facility: CLINIC | Age: 29
End: 2019-01-21

## 2019-01-21 NOTE — TELEPHONE ENCOUNTER
Patient notified and voiced understanding. Patient advised to contact office if they have any questions. Patient stated that she would like to be given her first shot in the office at her next appointment on 2/21/19.

## 2019-01-21 NOTE — TELEPHONE ENCOUNTER
----- Message from Juan Jose Mcgee MD sent at 1/19/2019  3:13 PM EST -----  Please inform the patient of the following abnormal results.  Has vitamin b12 def. Will need monthly vitamin b12 injections. Send in vitamin b12 prescription. If she would like first injection in the office, we can do that as well.

## 2019-02-21 ENCOUNTER — OFFICE VISIT (OUTPATIENT)
Dept: INTERNAL MEDICINE | Facility: CLINIC | Age: 29
End: 2019-02-21

## 2019-02-21 VITALS
HEART RATE: 107 BPM | BODY MASS INDEX: 19.62 KG/M2 | SYSTOLIC BLOOD PRESSURE: 96 MMHG | DIASTOLIC BLOOD PRESSURE: 60 MMHG | WEIGHT: 106.6 LBS | HEIGHT: 62 IN | OXYGEN SATURATION: 98 %

## 2019-02-21 DIAGNOSIS — E53.8 VITAMIN B 12 DEFICIENCY: Primary | ICD-10-CM

## 2019-02-21 PROCEDURE — 99213 OFFICE O/P EST LOW 20 MIN: CPT | Performed by: FAMILY MEDICINE

## 2019-02-21 PROCEDURE — 96372 THER/PROPH/DIAG INJ SC/IM: CPT | Performed by: FAMILY MEDICINE

## 2019-02-21 RX ORDER — DEXTROAMPHETAMINE SACCHARATE, AMPHETAMINE ASPARTATE, DEXTROAMPHETAMINE SULFATE, AND AMPHETAMINE SULFATE 3.75; 3.75; 3.75; 3.75 MG/1; MG/1; MG/1; MG/1
30 TABLET ORAL DAILY
COMMUNITY
End: 2019-04-30 | Stop reason: DRUGHIGH

## 2019-02-21 RX ORDER — CYANOCOBALAMIN 1000 UG/ML
1000 INJECTION, SOLUTION INTRAMUSCULAR; SUBCUTANEOUS
Qty: 3 ML | Refills: 6 | Status: SHIPPED | OUTPATIENT
Start: 2019-02-21 | End: 2019-06-13 | Stop reason: SDUPTHER

## 2019-02-21 RX ORDER — CYANOCOBALAMIN 1000 UG/ML
1000 INJECTION, SOLUTION INTRAMUSCULAR; SUBCUTANEOUS
Status: SHIPPED | OUTPATIENT
Start: 2019-02-21

## 2019-02-21 RX ADMIN — CYANOCOBALAMIN 1000 MCG: 1000 INJECTION, SOLUTION INTRAMUSCULAR; SUBCUTANEOUS at 11:51

## 2019-02-21 NOTE — PROGRESS NOTES
Subjective   Dina Chacon is a 28 y.o. female.     Chief Complaint   Patient presents with   • Vitamin B12 Def   • Macrocytosis Anemia         History of Present Illness     Patient has vitamin B12 deficiency was found on lab work.  Discussed with patient that she should be started on vitamin B12 injections monthly.  We will do the first injection today's office visit.    The following portions of the patient's history were reviewed and updated as appropriate: allergies, current medications, past family history, past medical history, past social history, past surgical history and problem list.    Review of Systems   Constitutional: Negative.    HENT: Negative.    Respiratory: Negative.    Cardiovascular: Negative.    Genitourinary: Negative.    Psychiatric/Behavioral: Negative.        Objective   Physical Exam   Constitutional: She is oriented to person, place, and time. She appears well-developed and well-nourished.   HENT:   Head: Normocephalic and atraumatic.   Eyes: EOM are normal.   Neck: Normal range of motion. Neck supple.   Neurological: She is alert and oriented to person, place, and time.   Psychiatric: She has a normal mood and affect. Her behavior is normal.   Nursing note and vitals reviewed.      Assessment/Plan   Dina was seen today for vitamin b12 def and macrocytosis anemia.    Diagnoses and all orders for this visit:    Vitamin B 12 deficiency  -     cyanocobalamin injection 1,000 mcg; Inject 1 mL into the appropriate muscle as directed by prescriber Every 28 (Twenty-Eight) Days.  -     cyanocobalamin 1000 MCG/ML injection; Inject 1 mL into the appropriate muscle as directed by prescriber Every 28 (Twenty-Eight) Days.  -     We will recheck B12 and methylmalonic acid as well as CBC in 6 months.  Counseled patient about importance of vitamin B12 replenishment.          No Follow-up on file.    Dictated utilizing Dragon Voice Recognition Software

## 2019-04-09 ENCOUNTER — TELEPHONE (OUTPATIENT)
Dept: INTERNAL MEDICINE | Facility: CLINIC | Age: 29
End: 2019-04-09

## 2019-04-09 DIAGNOSIS — Q76.49 CONGENITAL ANOMALY OF LUMBAR VERTEBRA: ICD-10-CM

## 2019-04-09 DIAGNOSIS — G89.29 CHRONIC RIGHT-SIDED LOW BACK PAIN WITH RIGHT-SIDED SCIATICA: ICD-10-CM

## 2019-04-09 DIAGNOSIS — M25.551 RIGHT HIP PAIN: Primary | ICD-10-CM

## 2019-04-09 DIAGNOSIS — M54.41 CHRONIC RIGHT-SIDED LOW BACK PAIN WITH RIGHT-SIDED SCIATICA: ICD-10-CM

## 2019-04-09 NOTE — TELEPHONE ENCOUNTER
Patient called stating that she has been having severe right sided low back, butt, hip, and leg pain. Patient stated that she has a history of back pain. Patient stated that she is on week four of severe pain. Patient stated that this pain brings her to tears daily. Patient stated that the week Dr. Mcgee was on vacation her chiropractor recommended steroids so she went to  and finished the steroids. Patient stated that she was also given muscle relaxers but those scare her so she's only been taking 1/4 a tablet at bedtime to help with the spasms. Patient stated that she has been taking  OTC ibuprofen which isn't doing much but slightly takes the pain down. Patient stated that she had an MRI done about 5-6 years ago which showed arthritis, bulging disc issues, and an extra L6 vertebra. Patient stated that she can get a copy of this if Dr. Mcgee wants to see it. Patient stated that she has an appointment scheduled with Dr. Mcgee on 4/30/19. Patient wanted to see if she could get any imaging orders to review with Dr. Mcgee at that appointment. Please advise.

## 2019-04-10 NOTE — TELEPHONE ENCOUNTER
She needs to take the muscle relaxants as prescribed. She will be fine with those. Instead of ibuprofen, have her do duexis for some gi protection. Send in a prescription of Duexis 800 1 tablet q8 hrs prn pain.   Also she can get a lumbar Xray. Would also like to repeat a lumbar and sacral MRI, since her pain is so significant and we can compare changes.

## 2019-04-10 NOTE — TELEPHONE ENCOUNTER
Patient notified and voiced understanding. Patient advised to contact office if they have any questions.  Prescription sent to [facility]. Order for XRAY and MRI's entered into Epic.

## 2019-04-11 ENCOUNTER — HOSPITAL ENCOUNTER (OUTPATIENT)
Dept: GENERAL RADIOLOGY | Facility: HOSPITAL | Age: 29
Discharge: HOME | End: 2019-04-11
Admitting: FAMILY MEDICINE

## 2019-04-11 DIAGNOSIS — G89.29 CHRONIC RIGHT-SIDED LOW BACK PAIN WITH RIGHT-SIDED SCIATICA: ICD-10-CM

## 2019-04-11 DIAGNOSIS — Q76.49 CONGENITAL ANOMALY OF LUMBAR VERTEBRA: ICD-10-CM

## 2019-04-11 DIAGNOSIS — M54.41 CHRONIC RIGHT-SIDED LOW BACK PAIN WITH RIGHT-SIDED SCIATICA: ICD-10-CM

## 2019-04-11 PROBLEM — M54.50 SEVERE LOW BACK PAIN: Status: ACTIVE | Noted: 2019-04-11

## 2019-04-11 PROCEDURE — 72100 X-RAY EXAM L-S SPINE 2/3 VWS: CPT

## 2019-04-22 ENCOUNTER — HOSPITAL ENCOUNTER (OUTPATIENT)
Dept: MRI IMAGING | Facility: HOSPITAL | Age: 29
Discharge: HOME | End: 2019-04-22

## 2019-04-22 ENCOUNTER — TRANSCRIBE ORDERS (OUTPATIENT)
Dept: ADMINISTRATIVE | Facility: HOSPITAL | Age: 29
End: 2019-04-22

## 2019-04-22 ENCOUNTER — HOSPITAL ENCOUNTER (OUTPATIENT)
Dept: MRI IMAGING | Facility: HOSPITAL | Age: 29
Discharge: HOME | End: 2019-04-22
Admitting: FAMILY MEDICINE

## 2019-04-22 DIAGNOSIS — G89.29 CHRONIC RIGHT-SIDED LOW BACK PAIN WITH RIGHT-SIDED SCIATICA: ICD-10-CM

## 2019-04-22 DIAGNOSIS — Q76.49 CONGENITAL ANOMALY OF LUMBAR VERTEBRA: ICD-10-CM

## 2019-04-22 DIAGNOSIS — M54.41 CHRONIC RIGHT-SIDED LOW BACK PAIN WITH RIGHT-SIDED SCIATICA: ICD-10-CM

## 2019-04-22 DIAGNOSIS — M25.551 RIGHT HIP PAIN: ICD-10-CM

## 2019-04-22 PROCEDURE — 73721 MRI JNT OF LWR EXTRE W/O DYE: CPT

## 2019-04-22 PROCEDURE — 72148 MRI LUMBAR SPINE W/O DYE: CPT

## 2019-04-23 ENCOUNTER — TELEPHONE (OUTPATIENT)
Dept: OBSTETRICS AND GYNECOLOGY | Age: 29
End: 2019-04-23

## 2019-04-23 DIAGNOSIS — M51.26 LUMBAR DISC HERNIATION: Primary | ICD-10-CM

## 2019-04-24 ENCOUNTER — PRIOR AUTHORIZATION (OUTPATIENT)
Dept: INTERNAL MEDICINE | Facility: CLINIC | Age: 29
End: 2019-04-24

## 2019-04-24 NOTE — TELEPHONE ENCOUNTER
Prior authorization for Duexis has been approved from 4/11/19 to 4/10/20 for a max of 12 fills. The reference number is [id number].

## 2019-04-25 NOTE — PROGRESS NOTES
Please inform the patient of the following abnormal results.  Needs to follow up with gynecology.   Needs also a referral to neurosurgery.  Spoke to radiologist about findings on the phone.

## 2019-04-25 NOTE — PROGRESS NOTES
Please inform the patient of the following abnormal results.  Needs to see gynecology. Most likely needs repeat ultrasound transabdominal and transvaginal. But can be done with gyn.

## 2019-04-25 NOTE — PROGRESS NOTES
Please inform the patient of the following abnormal results.  MRI has been done and notified patient.

## 2019-04-26 ENCOUNTER — TELEPHONE (OUTPATIENT)
Dept: INTERNAL MEDICINE | Facility: CLINIC | Age: 29
End: 2019-04-26

## 2019-04-26 NOTE — TELEPHONE ENCOUNTER
----- Message from Juan Jose Mcgee MD sent at 4/25/2019  5:23 PM EDT -----  Please inform the patient of the following abnormal results.  Needs to see gynecology. Most likely needs repeat ultrasound transabdominal and transvaginal. But can be done with gyn.

## 2019-04-26 NOTE — TELEPHONE ENCOUNTER
Patient notified and voiced understanding. Patient advised to contact office if they have any questions. Patient has contacted OB/GYN.

## 2019-04-26 NOTE — TELEPHONE ENCOUNTER
Patient notified and voiced understanding. Patient advised to contact office if they have any questions. MRI's ordered.

## 2019-04-26 NOTE — TELEPHONE ENCOUNTER
----- Message from Juan Jose Mcgee MD sent at 4/25/2019  5:47 PM EDT -----  Please inform the patient of the following abnormal results.  MRI has been done and notified patient.

## 2019-04-26 NOTE — TELEPHONE ENCOUNTER
----- Message from Juan Jose Mcgee MD sent at 4/25/2019  5:24 PM EDT -----  Please inform the patient of the following abnormal results.  Needs to follow up with gynecology.   Needs also a referral to neurosurgery.  Spoke to radiologist about findings on the phone.

## 2019-04-30 ENCOUNTER — OFFICE VISIT (OUTPATIENT)
Dept: INTERNAL MEDICINE | Facility: CLINIC | Age: 29
End: 2019-04-30

## 2019-04-30 VITALS
OXYGEN SATURATION: 99 % | HEART RATE: 107 BPM | SYSTOLIC BLOOD PRESSURE: 100 MMHG | HEIGHT: 62 IN | BODY MASS INDEX: 18.37 KG/M2 | WEIGHT: 99.8 LBS | DIASTOLIC BLOOD PRESSURE: 62 MMHG

## 2019-04-30 DIAGNOSIS — N94.89 ADNEXAL MASS: ICD-10-CM

## 2019-04-30 DIAGNOSIS — M51.36 BULGING LUMBAR DISC: Primary | ICD-10-CM

## 2019-04-30 PROBLEM — M51.369 BULGING LUMBAR DISC: Status: ACTIVE | Noted: 2019-04-30

## 2019-04-30 PROCEDURE — 99215 OFFICE O/P EST HI 40 MIN: CPT | Performed by: FAMILY MEDICINE

## 2019-04-30 RX ORDER — METHOCARBAMOL 500 MG/1
500 TABLET, FILM COATED ORAL 4 TIMES DAILY PRN
Qty: 120 TABLET | Refills: 0 | Status: SHIPPED | OUTPATIENT
Start: 2019-04-30 | End: 2020-10-30 | Stop reason: SDUPTHER

## 2019-04-30 RX ORDER — ACETAMINOPHEN AND CODEINE PHOSPHATE 300; 30 MG/1; MG/1
1 TABLET ORAL 2 TIMES DAILY PRN
Qty: 60 TABLET | Refills: 0 | Status: SHIPPED | OUTPATIENT
Start: 2019-04-30 | End: 2019-05-31

## 2019-04-30 RX ORDER — DEXTROAMPHETAMINE SULFATE, DEXTROAMPHETAMINE SACCHARATE, AMPHETAMINE SULFATE AND AMPHETAMINE ASPARTATE 5; 5; 5; 5 MG/1; MG/1; MG/1; MG/1
20 CAPSULE, EXTENDED RELEASE ORAL DAILY
COMMUNITY
Start: 2019-04-13

## 2019-05-06 ENCOUNTER — OFFICE VISIT (OUTPATIENT)
Dept: OBSTETRICS AND GYNECOLOGY | Age: 29
End: 2019-05-06

## 2019-05-06 ENCOUNTER — PROCEDURE VISIT (OUTPATIENT)
Dept: OBSTETRICS AND GYNECOLOGY | Age: 29
End: 2019-05-06

## 2019-05-06 VITALS
BODY MASS INDEX: 18.22 KG/M2 | WEIGHT: 99 LBS | SYSTOLIC BLOOD PRESSURE: 100 MMHG | HEIGHT: 62 IN | DIASTOLIC BLOOD PRESSURE: 60 MMHG

## 2019-05-06 DIAGNOSIS — N94.89 ADNEXAL MASS: Primary | ICD-10-CM

## 2019-05-06 DIAGNOSIS — N83.209 CYST OF OVARY, UNSPECIFIED LATERALITY: Primary | ICD-10-CM

## 2019-05-06 PROCEDURE — 99213 OFFICE O/P EST LOW 20 MIN: CPT | Performed by: OBSTETRICS & GYNECOLOGY

## 2019-05-06 PROCEDURE — 76830 TRANSVAGINAL US NON-OB: CPT | Performed by: OBSTETRICS & GYNECOLOGY

## 2019-05-06 NOTE — PROGRESS NOTES
GYN Visit    2019    Patient: Dina Chacon          MR#:[id number]      Chief Complaint   Patient presents with   • Imaging Only     PT HERE FOR GYN U/S DUE TO CYST FOUND ON OVARY. BROUGHT MRI REPORT (IN EPIC AS WELL). SHE IS GOOD.       History of Present Illness    28 y.o. female  who presents for  Ovarian cyst found on MRI  Incidental finding, no pelvic pain, menses regular and no issues  No recent issues with BV  Having a lot of back issues and may have to have surgery  Seeing a neurosurgeon  US done today and reviewed        Patient's last menstrual period was 2019.    ________________________________________  Patient Active Problem List   Diagnosis   • Bursitis of left foot   • Pain of toe of left foot   • Severe low back pain   • Adnexal mass   • Bulging lumbar disc       Past Medical History:   Diagnosis Date   • ADHD (attention deficit hyperactivity disorder)    • Head ache    • Pain in joint, foot, left        Past Surgical History:   Procedure Laterality Date   • BREAST SURGERY      augmentation   • BREAST SURGERY  2016    REPAIR (LEAK)   •  SECTION     •  SECTION     •  SECTION     • COLPOSCOPY     • LEEP     • NOSE SURGERY      NASAL RECONSTRUCTION   • RHINOPLASTY  2017    RHINO/SEPTOPLASTY       Social History     Tobacco Use   Smoking Status Former Smoker   • Packs/day: 0.50   Smokeless Tobacco Never Used       has a current medication list which includes the following prescription(s): acetaminophen-codeine, adderall xr, cyanocobalamin, and methocarbamol, and the following Facility-Administered Medications: cyanocobalamin.  ________________________________________    Current contraception: vasectomy      The following portions of the patient's history were reviewed and updated as appropriate: allergies, current medications, past family history, past medical history, past social history, past surgical history and problem  "list.    Review of Systems    Pertinent items are noted in HPI.     Objective   Physical Exam    /60   Ht 157.5 cm (62\")   Wt 44.9 kg (99 lb)   LMP 05/05/2019   BMI 18.11 kg/m²    BP Readings from Last 3 Encounters:   05/06/19 100/60   04/30/19 100/62   02/21/19 96/60      Wt Readings from Last 3 Encounters:   05/06/19 44.9 kg (99 lb)   04/30/19 45.3 kg (99 lb 12.8 oz)   04/22/19 44.5 kg (98 lb)      BMI: Estimated body mass index is 18.11 kg/m² as calculated from the following:    Height as of this encounter: 157.5 cm (62\").    Weight as of this encounter: 44.9 kg (99 lb).    Lungs: non labored breathing, no wheezing or tachpnea  Extremities: extremities normal, atraumatic, no cyanosis or edema  Skin: Skin color, texture, turgor normal. No rashes or lesions  Neurologic: Grossly normal  General:   alert, appears stated age and cooperative   Abdomen: soft, non-tender, without masses or organomegaly       Vulva: normal   Vagina: normal mucosa   Cervix: no cervical motion tenderness and no lesions   Uterus: normal size, mobile or non-tender   Adnexa: no mass, fullness, tenderness     Assessment:      Dina was seen today for imaging only.    Diagnoses and all orders for this visit:    Cyst of ovary, unspecified laterality      See US  todays pelvic US is normal,  2 normal sized follicles , one looks hemorrhagic but these are less than 2 cm  MRI showed a 6 cm cystic structure right ovary and today that has totally resolved    Pt is reassured.   Follow up AE and prn        "

## 2019-05-13 ENCOUNTER — TELEPHONE (OUTPATIENT)
Dept: NEUROSURGERY | Facility: CLINIC | Age: 29
End: 2019-05-13

## 2019-05-13 NOTE — TELEPHONE ENCOUNTER
Left message on voicemail re: have they received new patient packet.  If so, the packet needs to be in the office by 5-23-19 in order to keep the appointment on 5-30-19 at 315 pm arriving at 245 pm.  If not, please contact the office at [phone] so that we may get another packet mailed out to you.

## 2019-05-14 NOTE — PROGRESS NOTES
Subjective   Dina Chacon is a 28 y.o. female.     Chief Complaint   Patient presents with   • [facility] Follow Up     3/28 LBP radiating, had MRI and XRay done, Results have been discussed, Neurosurgery appt scheduled for  5/30/19 and OB/GYN appt scheduled for 5/6/19         History of Present Illness     Patient was seen at the urgent care at the end of March.  Patient had a low back pain that was radiating.  Due to the pain, patient was seen and x-ray was done.  The x-ray showed there may be partial sacralization of of a sixth lumbar vertebral body. Moderate facet arthropathy is present within the lower lumbar spine.  And an MRI was recommended.  The MRI showed: Transitional anatomy is noted at the level of the lumbosacral junction and the transitional vertebral body has been enumerated as a relatively lumbarized S1 segment for the purposes of this report. Please take careful note of this enumeration system at the time of any potential intervention. There is degenerative grade 1 retrolisthesis of L5 on S1 by approximately 3 mm. Degenerative disc changes are identified at the L5-S1 level and there is central located protruding disc material as well as a right subarticular/right lateral recess disc extrusion. The centrally located protruding disc material results in mild canal and mild-to-moderate bilateral lateral recess narrowing at the L5-S1 level on the right. A subarticular disc extrusion causes mass effect upon the descending right S1 nerve root sleeve within the right lateral recess. At the L4-5 level, there is bulging disc material with a posterior annular fissure. There is a mild-to-moderate degree of left foraminal narrowing secondary to the bulging disc material. Incidental note is made of a sizable right adnexal region mass measuring up to 6.3 x 4.1 cm in greatest axial dimensions containing a hemorrhagic component along the left side measuring up to 2.7 x 2.6 cm in greatest axial dimensions. Again,  the findings are nonspecific in nature and may represent a large hemorrhagic complex cystic lesion. As recommended in the pelvic MRI report, follow-up ultrasound should be obtained in an approximate 4-6 week interval to reevaluate these findings.    The following portions of the patient's history were reviewed and updated as appropriate: allergies, current medications, past family history, past medical history, past social history, past surgical history and problem list.    Review of Systems   Constitutional: Negative for chills and fever.   HENT: Negative for congestion, rhinorrhea, sinus pain and sore throat.    Eyes: Negative for photophobia and visual disturbance.   Respiratory: Negative for cough, chest tightness and shortness of breath.    Cardiovascular: Negative for chest pain and palpitations.   Gastrointestinal: Negative for diarrhea, nausea and vomiting.   Genitourinary: Negative for dysuria, frequency and urgency.   Musculoskeletal: Positive for back pain.   Skin: Negative for rash and wound.   Neurological: Negative for dizziness and syncope.   Psychiatric/Behavioral: Negative for behavioral problems and confusion.       Objective   Physical Exam   Constitutional: She is oriented to person, place, and time. She appears well-developed and well-nourished.   HENT:   Head: Normocephalic and atraumatic.   Right Ear: External ear normal.   Left Ear: External ear normal.   Eyes: EOM are normal.   Neck: Normal range of motion. Neck supple.   Cardiovascular: Normal rate, regular rhythm and normal heart sounds.   Pulmonary/Chest: Effort normal and breath sounds normal. No respiratory distress.   Musculoskeletal: Normal range of motion.        Lumbar back: She exhibits tenderness, pain and spasm.   Lymphadenopathy:     She has no cervical adenopathy.   Neurological: She is alert and oriented to person, place, and time.   Skin: Skin is warm.   Psychiatric: She has a normal mood and affect. Her behavior is normal.    Nursing note and vitals reviewed.      Assessment/Plan   Dina was seen today for [facility] follow up.    Diagnoses and all orders for this visit:    Bulging lumbar disc  -     methocarbamol (ROBAXIN) 500 MG tablet; Take 1 tablet by mouth 4 (Four) Times a Day As Needed for Muscle Spasms.  -     acetaminophen-codeine (TYLENOL #3) 300-30 MG per tablet; Take 1 tablet by mouth 2 (Two) Times a Day As Needed for Mild Pain  or Moderate Pain .  -     I discussed the patient that she has a bulging disc.  I recommend the patient have her follow-up with neurosurgery.  Patient may take over-the-counter NSAIDs.  We will start patient on muscle relaxant Robaxin.  I also provided patient with Tylenol 3 if she needs it for moderate pain.    Adnexal mass        -     She is concerned about the adnexal mass that was found.  I discussed with patient that I discussed the finding with the radiologist, and it could be related to where patient may be on her cycle.  I recommended patient follow-up with gynecology.  Gynecology most likely will do an ultrasound for further diagnostics.  Patient understands this and agrees with plan.    At today's office visit I spent greater than 40 minutes with the patient.  During this time more than half of time was spent in counseling patient about disease pathology as well as a pharmacology used to treat this.  I have also spent time with patient and reviewing imaging studies as well as coordinating care for patient to see other specialist.      No Follow-up on file.    Dictated utilizing Dragon Voice Recognition Software

## 2019-05-27 ENCOUNTER — HOSPITAL ENCOUNTER (EMERGENCY)
Facility: HOSPITAL | Age: 29
Discharge: HOME | End: 2019-05-27
Attending: EMERGENCY MEDICINE | Admitting: EMERGENCY MEDICINE

## 2019-05-27 VITALS
TEMPERATURE: 98 F | HEIGHT: 62 IN | SYSTOLIC BLOOD PRESSURE: 91 MMHG | RESPIRATION RATE: 16 BRPM | DIASTOLIC BLOOD PRESSURE: 49 MMHG | HEART RATE: 81 BPM | WEIGHT: 118.6 LBS | BODY MASS INDEX: 21.83 KG/M2 | OXYGEN SATURATION: 100 %

## 2019-05-27 DIAGNOSIS — M54.31 SCIATICA OF RIGHT SIDE: Primary | ICD-10-CM

## 2019-05-27 PROCEDURE — 25010000002 HYDROMORPHONE PER 4 MG: Performed by: EMERGENCY MEDICINE

## 2019-05-27 PROCEDURE — 96375 TX/PRO/DX INJ NEW DRUG ADDON: CPT

## 2019-05-27 PROCEDURE — 96376 TX/PRO/DX INJ SAME DRUG ADON: CPT

## 2019-05-27 PROCEDURE — 25010000002 DEXAMETHASONE PER 1 MG: Performed by: EMERGENCY MEDICINE

## 2019-05-27 PROCEDURE — 96374 THER/PROPH/DIAG INJ IV PUSH: CPT

## 2019-05-27 PROCEDURE — 25010000002 LORAZEPAM PER 2 MG: Performed by: EMERGENCY MEDICINE

## 2019-05-27 PROCEDURE — 99283 EMERGENCY DEPT VISIT LOW MDM: CPT

## 2019-05-27 PROCEDURE — 25010000002 ONDANSETRON PER 1 MG: Performed by: EMERGENCY MEDICINE

## 2019-05-27 RX ORDER — LORAZEPAM 2 MG/ML
0.5 INJECTION INTRAMUSCULAR ONCE
Status: COMPLETED | OUTPATIENT
Start: 2019-05-27 | End: 2019-05-27

## 2019-05-27 RX ORDER — HYDROMORPHONE HYDROCHLORIDE 1 MG/ML
0.5 INJECTION, SOLUTION INTRAMUSCULAR; INTRAVENOUS; SUBCUTANEOUS ONCE
Status: COMPLETED | OUTPATIENT
Start: 2019-05-27 | End: 2019-05-27

## 2019-05-27 RX ORDER — ONDANSETRON 2 MG/ML
4 INJECTION INTRAMUSCULAR; INTRAVENOUS ONCE
Status: COMPLETED | OUTPATIENT
Start: 2019-05-27 | End: 2019-05-27

## 2019-05-27 RX ORDER — SODIUM CHLORIDE 0.9 % (FLUSH) 0.9 %
10 SYRINGE (ML) INJECTION AS NEEDED
Status: DISCONTINUED | OUTPATIENT
Start: 2019-05-27 | End: 2019-05-27 | Stop reason: HOSPADM

## 2019-05-27 RX ORDER — TRAMADOL HYDROCHLORIDE 50 MG/1
50 TABLET ORAL EVERY 6 HOURS PRN
Qty: 20 TABLET | Refills: 0 | Status: SHIPPED | OUTPATIENT
Start: 2019-05-27 | End: 2019-06-05 | Stop reason: HOSPADM

## 2019-05-27 RX ADMIN — ONDANSETRON HYDROCHLORIDE 4 MG: 2 SOLUTION INTRAMUSCULAR; INTRAVENOUS at 05:15

## 2019-05-27 RX ADMIN — DEXAMETHASONE SODIUM PHOSPHATE 10 MG: 10 INJECTION INTRAMUSCULAR; INTRAVENOUS at 06:15

## 2019-05-27 RX ADMIN — LORAZEPAM 0.5 MG: 2 INJECTION INTRAMUSCULAR; INTRAVENOUS at 05:20

## 2019-05-27 RX ADMIN — HYDROMORPHONE HYDROCHLORIDE 0.5 MG: 1 INJECTION, SOLUTION INTRAMUSCULAR; INTRAVENOUS; SUBCUTANEOUS at 06:23

## 2019-05-27 RX ADMIN — HYDROMORPHONE HYDROCHLORIDE 0.5 MG: 1 INJECTION, SOLUTION INTRAMUSCULAR; INTRAVENOUS; SUBCUTANEOUS at 05:18

## 2019-05-28 ENCOUNTER — OFFICE VISIT (OUTPATIENT)
Dept: NEUROSURGERY | Facility: CLINIC | Age: 29
End: 2019-05-28

## 2019-05-28 VITALS
BODY MASS INDEX: 21.71 KG/M2 | SYSTOLIC BLOOD PRESSURE: 105 MMHG | HEIGHT: 62 IN | HEART RATE: 81 BPM | WEIGHT: 118 LBS | DIASTOLIC BLOOD PRESSURE: 60 MMHG

## 2019-05-28 DIAGNOSIS — M51.16 NEURITIS OR RADICULITIS DUE TO RUPTURE OF LUMBAR INTERVERTEBRAL DISC: Primary | ICD-10-CM

## 2019-05-28 PROCEDURE — 99244 OFF/OP CNSLTJ NEW/EST MOD 40: CPT | Performed by: NEUROLOGICAL SURGERY

## 2019-05-28 RX ORDER — CEFAZOLIN SODIUM 2 G/100ML
2 INJECTION, SOLUTION INTRAVENOUS ONCE
Status: CANCELLED | OUTPATIENT
Start: 2019-06-05 | End: 2019-05-28

## 2019-05-28 NOTE — PROGRESS NOTES
Subjective   Patient ID: Dina Chacon is a 28 y.o. female is being seen for consultation today at the request of Juan Jose Mcgee MD for back pain that radiates into her right leg with numbness and tingling. She has tried Ibuprofen, Tramadol and Dexis.     History of Present Illness     This patient has been having pain in her back with radiation into her right leg for about 3 months.  The problem originally began without a particular incident.  The pain is located in the right side of her lower back and radiates into her right buttock and down her entire right leg.  It goes into her right foot as well.  The left leg is okay overall.  She has no difficulty with bowel and bladder control or other associated symptoms.  The pain is worse with any kind of activity and is also worse in the morning and at night.  If she does not move it is a little bit better but not a lot.  She has been treated with anti-inflammatory medications pain medications steroids, as well as time.    The following portions of the patient's history were reviewed and updated as appropriate: allergies, current medications, past family history, past medical history, past social history, past surgical history and problem list.    Review of Systems   Constitutional: Positive for activity change, diaphoresis and fatigue.   Respiratory: Negative for chest tightness and shortness of breath.    Cardiovascular: Negative for chest pain.   Musculoskeletal: Positive for back pain, joint swelling, myalgias and neck stiffness.        Right leg pain   Neurological: Positive for dizziness, weakness and numbness.        Positive for tingling   Psychiatric/Behavioral: Positive for dysphoric mood and sleep disturbance.   All other systems reviewed and are negative.      Objective   Physical Exam   Constitutional: She is oriented to person, place, and time. She appears well-developed and well-nourished.   HENT:   Head: Normocephalic and atraumatic.   Eyes:  Conjunctivae and EOM are normal. Pupils are equal, round, and reactive to light.   Fundoscopic exam:       The right eye shows no papilledema. The right eye shows venous pulsations.        The left eye shows no papilledema. The left eye shows venous pulsations.   Neck: Carotid bruit is not present.   Neurological: She is oriented to person, place, and time. She has a normal Finger-Nose-Finger Test and a normal Heel to Shin Test. Gait normal.   Reflex Scores:       Tricep reflexes are 2+ on the right side and 2+ on the left side.       Bicep reflexes are 2+ on the right side and 2+ on the left side.       Brachioradialis reflexes are 2+ on the right side and 2+ on the left side.       Patellar reflexes are 2+ on the right side and 2+ on the left side.       Achilles reflexes are 1+ on the right side and 2+ on the left side.  Psychiatric: Her speech is normal.     Neurologic Exam     Mental Status   Oriented to person, place, and time.   Registration of memory: Good recent and remote memory.   Attention: normal. Concentration: normal.   Speech: speech is normal   Level of consciousness: alert  Knowledge: consistent with education.     Cranial Nerves     CN II   Visual fields full to confrontation.   Visual acuity: normal    CN III, IV, VI   Pupils are equal, round, and reactive to light.  Extraocular motions are normal.     CN V   Facial sensation intact.   Right corneal reflex: normal  Left corneal reflex: normal    CN VII   Facial expression full, symmetric.   Right facial weakness: none  Left facial weakness: none    CN VIII   Hearing: intact    CN IX, X   Palate: symmetric    CN XI   Right sternocleidomastoid strength: normal  Left sternocleidomastoid strength: normal    CN XII   Tongue: not atrophic  Tongue deviation: none    Motor Exam   Muscle bulk: normal  Right arm tone: normal  Left arm tone: normal  Right leg tone: normal  Left leg tone: normal    Strength   Strength 5/5 except as noted.   Right gastroc:  4/5    Sensory Exam   Light touch normal.   Sensory deficit distribution on right: S1    Gait, Coordination, and Reflexes     Gait  Gait: normal    Coordination   Finger to nose coordination: normal  Heel to shin coordination: normal    Reflexes   Right brachioradialis: 2+  Left brachioradialis: 2+  Right biceps: 2+  Left biceps: 2+  Right triceps: 2+  Left triceps: 2+  Right patellar: 2+  Left patellar: 2+  Right achilles: 1+  Left achilles: 2+  Right : 2+  Left : 2+      Assessment/Plan   Independent Review of Radiographic Studies:      I reviewed a set of plain films as well as an MRI of her lumbar spine done in April.  The plain films show minimal degenerative disease.  They do not indicate any overt instability.    I also reviewed her MRI.  This shows a definite disc herniation to the right side at the level of L5-S1 and a little bit below the disc space to the right.  It is definitely compressing the right S1 nerve and is just medial to the pedicle of S1.  The other levels mostly look okay.  L4-5 is widely open as is L3-4, L2-3, L1 to.    Medical Decision Making:      I told the patient and her family about the imaging.  I told him that from my point of view she could try some epidural blocks and physical therapy or she could proceed with surgery.  She does have a neurologic deficit and this makes the chances of nonsurgical treatment helping much lower.  The other option is to proceed with surgery.  I told the patient about the risks, complications and expected outcome of the lumbar surgery.  I explained that there was an 80% chance of getting rid of the pain in the leg.  I explained that there would still be back pain after the surgery.  Initially this will be quite severe but will improve over time.  There is a 2 or 3% chance of infection, bleeding, CSF leak, damage to the nerve as a result of surgery, paralysis, as well as anesthetic risk.  There is a 5% chance of late instability which could  require a fusion later on and a 10% chance of recurrent disc herniation.  We discussed the postoperative hospital and home course.  She would like to discuss it with her family and will let us know what she would like to do.  If she wants to proceed:    She will need to be scheduled for a: Right L5-S1 laminectomy and discectomy with metrx    Dina was seen today for back pain and leg pain.    Diagnoses and all orders for this visit:    Neuritis or radiculitis due to rupture of lumbar intervertebral disc  -     Case Request; Standing  -     ceFAZolin (ANCEF) 2 g in sodium chloride 0.9 % 100 mL IVPB  -     Case Request    Other orders  -     Follow anesthesia standing orders.  -     Obtain informed consent  -     Provide NPO Instructions to Patient; Future  -     Follow anesthesia standing orders.; Standing  -     SCD (sequential compression device)- to be placed on patient in Pre-op; Standing      Return for 2-3 week post op.

## 2019-05-31 ENCOUNTER — APPOINTMENT (OUTPATIENT)
Dept: PREADMISSION TESTING | Facility: HOSPITAL | Age: 29
End: 2019-05-31

## 2019-05-31 VITALS
SYSTOLIC BLOOD PRESSURE: 105 MMHG | RESPIRATION RATE: 16 BRPM | HEIGHT: 62 IN | OXYGEN SATURATION: 100 % | WEIGHT: 100 LBS | BODY MASS INDEX: 18.4 KG/M2 | DIASTOLIC BLOOD PRESSURE: 64 MMHG | TEMPERATURE: 97.7 F | HEART RATE: 84 BPM

## 2019-05-31 LAB
ANION GAP SERPL CALCULATED.3IONS-SCNC: 12 MMOL/L
BUN BLD-MCNC: 10 MG/DL (ref 6–20)
BUN/CREAT SERPL: 18.2 (ref 7–25)
CALCIUM SPEC-SCNC: 8.9 MG/DL (ref 8.6–10.5)
CHLORIDE SERPL-SCNC: 99 MMOL/L (ref 98–107)
CO2 SERPL-SCNC: 25 MMOL/L (ref 22–29)
CREAT BLD-MCNC: 0.55 MG/DL (ref 0.57–1)
DEPRECATED RDW RBC AUTO: 43.5 FL (ref 37–54)
ERYTHROCYTE [DISTWIDTH] IN BLOOD BY AUTOMATED COUNT: 11.8 % (ref 12.3–15.4)
GFR SERPL CREATININE-BSD FRML MDRD: 132 ML/MIN/1.73
GLUCOSE BLD-MCNC: 91 MG/DL (ref 65–99)
HCG SERPL QL: NEGATIVE
HCT VFR BLD AUTO: 39.1 % (ref 34–46.6)
HGB BLD-MCNC: 13.3 G/DL (ref 12–15.9)
MCH RBC QN AUTO: 33.8 PG (ref 26.6–33)
MCHC RBC AUTO-ENTMCNC: 34 G/DL (ref 31.5–35.7)
MCV RBC AUTO: 99.5 FL (ref 79–97)
PLATELET # BLD AUTO: 298 10*3/MM3 (ref 140–450)
PMV BLD AUTO: 9.4 FL (ref 6–12)
POTASSIUM BLD-SCNC: 4 MMOL/L (ref 3.5–5.2)
RBC # BLD AUTO: 3.93 10*6/MM3 (ref 3.77–5.28)
SODIUM BLD-SCNC: 136 MMOL/L (ref 136–145)
WBC NRBC COR # BLD: 6.12 10*3/MM3 (ref 3.4–10.8)

## 2019-05-31 PROCEDURE — 80048 BASIC METABOLIC PNL TOTAL CA: CPT | Performed by: NEUROLOGICAL SURGERY

## 2019-05-31 PROCEDURE — 36415 COLL VENOUS BLD VENIPUNCTURE: CPT

## 2019-05-31 PROCEDURE — 84703 CHORIONIC GONADOTROPIN ASSAY: CPT | Performed by: NEUROLOGICAL SURGERY

## 2019-05-31 PROCEDURE — 85027 COMPLETE CBC AUTOMATED: CPT | Performed by: NEUROLOGICAL SURGERY

## 2019-05-31 RX ORDER — ACETAMINOPHEN AND CODEINE PHOSPHATE 300; 30 MG/1; MG/1
1 TABLET ORAL EVERY 4 HOURS PRN
COMMUNITY
End: 2019-06-13

## 2019-06-03 ENCOUNTER — TELEPHONE (OUTPATIENT)
Dept: NEUROSURGERY | Facility: CLINIC | Age: 29
End: 2019-06-03

## 2019-06-03 NOTE — TELEPHONE ENCOUNTER
Patient called with a few questions regarding her up coming surgery. She is in the middle of an interview with a great company. She has made it through 3 interview groups. She will have an hour long meeting with an executive that is flying in on 6/14/2019 which means she will only be 9 days out from her surgery. She wanted to know if her  drove her to the interview could you possibly sit to appear normal. She does not want this company turning her down since she recently had back surgery. She works in . She asked when her sitting restrictions will be lifted? I told her typically at her 2-3 week post-op visit it will be 30 minutes 2-3 times a day. I told her I would ask him and give her a call back. She also asked about if she is offered the position she will need to fly to [place] the end of July for a 4 day training should she be ok by then? I told her that will be close to 2 months out and given usual circumstances she should be ok by then to do so. But again it depends on her home and hospital course after surgery. But I did tell her she will want frequent breaks to stand and not continuously sit during that trip the end of July.     He said she can do what she needs to do however just let her know she is at an increased risk of disc re herniation if she sits as it puts pressure on her spine. She will think about what she needs to do and will call us back and let us know if she needs anything.

## 2019-06-05 ENCOUNTER — ANESTHESIA (OUTPATIENT)
Dept: PERIOP | Facility: HOSPITAL | Age: 29
End: 2019-06-05

## 2019-06-05 ENCOUNTER — ANESTHESIA EVENT (OUTPATIENT)
Dept: PERIOP | Facility: HOSPITAL | Age: 29
End: 2019-06-05

## 2019-06-05 ENCOUNTER — APPOINTMENT (OUTPATIENT)
Dept: GENERAL RADIOLOGY | Facility: HOSPITAL | Age: 29
End: 2019-06-05

## 2019-06-05 ENCOUNTER — TELEPHONE (OUTPATIENT)
Dept: INTERNAL MEDICINE | Facility: CLINIC | Age: 29
End: 2019-06-05

## 2019-06-05 ENCOUNTER — HOSPITAL ENCOUNTER (OUTPATIENT)
Facility: HOSPITAL | Age: 29
Setting detail: HOSPITAL OUTPATIENT SURGERY
Discharge: HOME | End: 2019-06-05
Attending: NEUROLOGICAL SURGERY | Admitting: NEUROLOGICAL SURGERY

## 2019-06-05 VITALS
SYSTOLIC BLOOD PRESSURE: 107 MMHG | BODY MASS INDEX: 17.86 KG/M2 | OXYGEN SATURATION: 100 % | HEART RATE: 70 BPM | HEIGHT: 62 IN | TEMPERATURE: 97.5 F | DIASTOLIC BLOOD PRESSURE: 67 MMHG | RESPIRATION RATE: 16 BRPM | WEIGHT: 97.06 LBS

## 2019-06-05 DIAGNOSIS — M51.16 NEURITIS OR RADICULITIS DUE TO RUPTURE OF LUMBAR INTERVERTEBRAL DISC: ICD-10-CM

## 2019-06-05 PROCEDURE — 63030 LAMOT DCMPRN NRV RT 1 LMBR: CPT | Performed by: NEUROLOGICAL SURGERY

## 2019-06-05 PROCEDURE — 25010000002 PHENYLEPHRINE PER 1 ML: Performed by: NURSE ANESTHETIST, CERTIFIED REGISTERED

## 2019-06-05 PROCEDURE — 25010000002 FENTANYL CITRATE (PF) 100 MCG/2ML SOLUTION: Performed by: ANESTHESIOLOGY

## 2019-06-05 PROCEDURE — 25010000003 CEFAZOLIN IN DEXTROSE 2-4 GM/100ML-% SOLUTION: Performed by: NEUROLOGICAL SURGERY

## 2019-06-05 PROCEDURE — 25010000002 ONDANSETRON PER 1 MG: Performed by: NURSE ANESTHETIST, CERTIFIED REGISTERED

## 2019-06-05 PROCEDURE — 25010000002 HYDROMORPHONE PER 4 MG: Performed by: NURSE ANESTHETIST, CERTIFIED REGISTERED

## 2019-06-05 PROCEDURE — 63030 LAMOT DCMPRN NRV RT 1 LMBR: CPT | Performed by: SPECIALIST/TECHNOLOGIST, OTHER

## 2019-06-05 PROCEDURE — 25010000002 PROPOFOL 10 MG/ML EMULSION: Performed by: NURSE ANESTHETIST, CERTIFIED REGISTERED

## 2019-06-05 PROCEDURE — 25010000002 MIDAZOLAM PER 1 MG: Performed by: ANESTHESIOLOGY

## 2019-06-05 PROCEDURE — 25010000002 NEOSTIGMINE PER 0.5 MG: Performed by: NURSE ANESTHETIST, CERTIFIED REGISTERED

## 2019-06-05 PROCEDURE — 72100 X-RAY EXAM L-S SPINE 2/3 VWS: CPT

## 2019-06-05 PROCEDURE — 76000 FLUOROSCOPY <1 HR PHYS/QHP: CPT

## 2019-06-05 PROCEDURE — 25010000002 DEXAMETHASONE PER 1 MG: Performed by: NURSE ANESTHETIST, CERTIFIED REGISTERED

## 2019-06-05 DEVICE — SSC BONE WAX
Type: IMPLANTABLE DEVICE | Status: FUNCTIONAL
Brand: SSC BONE WAX

## 2019-06-05 RX ORDER — HYDROMORPHONE HYDROCHLORIDE 1 MG/ML
0.5 INJECTION, SOLUTION INTRAMUSCULAR; INTRAVENOUS; SUBCUTANEOUS
Status: DISCONTINUED | OUTPATIENT
Start: 2019-06-05 | End: 2019-06-05 | Stop reason: HOSPADM

## 2019-06-05 RX ORDER — MIDAZOLAM HYDROCHLORIDE 1 MG/ML
1 INJECTION INTRAMUSCULAR; INTRAVENOUS
Status: DISCONTINUED | OUTPATIENT
Start: 2019-06-05 | End: 2019-06-05 | Stop reason: HOSPADM

## 2019-06-05 RX ORDER — FAMOTIDINE 10 MG/ML
20 INJECTION, SOLUTION INTRAVENOUS ONCE
Status: COMPLETED | OUTPATIENT
Start: 2019-06-05 | End: 2019-06-05

## 2019-06-05 RX ORDER — HYDRALAZINE HYDROCHLORIDE 20 MG/ML
5 INJECTION INTRAMUSCULAR; INTRAVENOUS
Status: DISCONTINUED | OUTPATIENT
Start: 2019-06-05 | End: 2019-06-05 | Stop reason: HOSPADM

## 2019-06-05 RX ORDER — ONDANSETRON 2 MG/ML
INJECTION INTRAMUSCULAR; INTRAVENOUS AS NEEDED
Status: DISCONTINUED | OUTPATIENT
Start: 2019-06-05 | End: 2019-06-05 | Stop reason: SURG

## 2019-06-05 RX ORDER — PROMETHAZINE HYDROCHLORIDE 25 MG/ML
12.5 INJECTION, SOLUTION INTRAMUSCULAR; INTRAVENOUS ONCE AS NEEDED
Status: DISCONTINUED | OUTPATIENT
Start: 2019-06-05 | End: 2019-06-05 | Stop reason: HOSPADM

## 2019-06-05 RX ORDER — FLUMAZENIL 0.1 MG/ML
0.2 INJECTION INTRAVENOUS AS NEEDED
Status: DISCONTINUED | OUTPATIENT
Start: 2019-06-05 | End: 2019-06-05 | Stop reason: HOSPADM

## 2019-06-05 RX ORDER — SCOLOPAMINE TRANSDERMAL SYSTEM 1 MG/1
1 PATCH, EXTENDED RELEASE TRANSDERMAL
Status: DISCONTINUED | OUTPATIENT
Start: 2019-06-05 | End: 2019-06-05 | Stop reason: HOSPADM

## 2019-06-05 RX ORDER — ONDANSETRON 4 MG/1
4 TABLET, FILM COATED ORAL EVERY 6 HOURS PRN
Status: COMPLETED | OUTPATIENT
Start: 2019-06-05 | End: 2019-06-05

## 2019-06-05 RX ORDER — ROCURONIUM BROMIDE 10 MG/ML
INJECTION, SOLUTION INTRAVENOUS AS NEEDED
Status: DISCONTINUED | OUTPATIENT
Start: 2019-06-05 | End: 2019-06-05 | Stop reason: SURG

## 2019-06-05 RX ORDER — EPHEDRINE SULFATE 50 MG/ML
5 INJECTION, SOLUTION INTRAVENOUS ONCE AS NEEDED
Status: DISCONTINUED | OUTPATIENT
Start: 2019-06-05 | End: 2019-06-05 | Stop reason: HOSPADM

## 2019-06-05 RX ORDER — FENTANYL CITRATE 50 UG/ML
50 INJECTION, SOLUTION INTRAMUSCULAR; INTRAVENOUS
Status: DISCONTINUED | OUTPATIENT
Start: 2019-06-05 | End: 2019-06-05 | Stop reason: HOSPADM

## 2019-06-05 RX ORDER — LIDOCAINE HYDROCHLORIDE 10 MG/ML
0.5 INJECTION, SOLUTION EPIDURAL; INFILTRATION; INTRACAUDAL; PERINEURAL ONCE AS NEEDED
Status: DISCONTINUED | OUTPATIENT
Start: 2019-06-05 | End: 2019-06-05 | Stop reason: HOSPADM

## 2019-06-05 RX ORDER — ONDANSETRON 4 MG/1
4 TABLET, FILM COATED ORAL EVERY 8 HOURS PRN
Qty: 21 TABLET | Refills: 0 | Status: SHIPPED | OUTPATIENT
Start: 2019-06-05 | End: 2019-07-30

## 2019-06-05 RX ORDER — ACETAMINOPHEN 325 MG/1
650 TABLET ORAL ONCE AS NEEDED
Status: DISCONTINUED | OUTPATIENT
Start: 2019-06-05 | End: 2019-06-05 | Stop reason: HOSPADM

## 2019-06-05 RX ORDER — DEXAMETHASONE SODIUM PHOSPHATE 10 MG/ML
INJECTION INTRAMUSCULAR; INTRAVENOUS AS NEEDED
Status: DISCONTINUED | OUTPATIENT
Start: 2019-06-05 | End: 2019-06-05 | Stop reason: SURG

## 2019-06-05 RX ORDER — HYDROCODONE BITARTRATE AND ACETAMINOPHEN 7.5; 325 MG/1; MG/1
1 TABLET ORAL ONCE AS NEEDED
Status: COMPLETED | OUTPATIENT
Start: 2019-06-05 | End: 2019-06-05

## 2019-06-05 RX ORDER — ONDANSETRON 2 MG/ML
4 INJECTION INTRAMUSCULAR; INTRAVENOUS ONCE AS NEEDED
Status: DISCONTINUED | OUTPATIENT
Start: 2019-06-05 | End: 2019-06-05 | Stop reason: HOSPADM

## 2019-06-05 RX ORDER — HYDROCODONE BITARTRATE AND ACETAMINOPHEN 5; 325 MG/1; MG/1
1 TABLET ORAL EVERY 4 HOURS PRN
Qty: 35 TABLET | Refills: 0
Start: 2019-06-05 | End: 2019-06-13

## 2019-06-05 RX ORDER — GLYCOPYRROLATE 0.2 MG/ML
INJECTION INTRAMUSCULAR; INTRAVENOUS AS NEEDED
Status: DISCONTINUED | OUTPATIENT
Start: 2019-06-05 | End: 2019-06-05 | Stop reason: SURG

## 2019-06-05 RX ORDER — CEFAZOLIN SODIUM 2 G/100ML
2 INJECTION, SOLUTION INTRAVENOUS ONCE
Status: COMPLETED | OUTPATIENT
Start: 2019-06-05 | End: 2019-06-05

## 2019-06-05 RX ORDER — PROPOFOL 10 MG/ML
VIAL (ML) INTRAVENOUS AS NEEDED
Status: DISCONTINUED | OUTPATIENT
Start: 2019-06-05 | End: 2019-06-05 | Stop reason: SURG

## 2019-06-05 RX ORDER — PROMETHAZINE HYDROCHLORIDE 25 MG/1
25 SUPPOSITORY RECTAL ONCE AS NEEDED
Status: DISCONTINUED | OUTPATIENT
Start: 2019-06-05 | End: 2019-06-05 | Stop reason: HOSPADM

## 2019-06-05 RX ORDER — OXYCODONE AND ACETAMINOPHEN 7.5; 325 MG/1; MG/1
1 TABLET ORAL ONCE AS NEEDED
Status: DISCONTINUED | OUTPATIENT
Start: 2019-06-05 | End: 2019-06-05 | Stop reason: HOSPADM

## 2019-06-05 RX ORDER — SODIUM CHLORIDE 0.9 % (FLUSH) 0.9 %
1-10 SYRINGE (ML) INJECTION AS NEEDED
Status: DISCONTINUED | OUTPATIENT
Start: 2019-06-05 | End: 2019-06-05 | Stop reason: HOSPADM

## 2019-06-05 RX ORDER — LIDOCAINE HYDROCHLORIDE 20 MG/ML
INJECTION, SOLUTION INFILTRATION; PERINEURAL AS NEEDED
Status: DISCONTINUED | OUTPATIENT
Start: 2019-06-05 | End: 2019-06-05 | Stop reason: SURG

## 2019-06-05 RX ORDER — DIPHENHYDRAMINE HCL 25 MG
25 CAPSULE ORAL
Status: DISCONTINUED | OUTPATIENT
Start: 2019-06-05 | End: 2019-06-05 | Stop reason: HOSPADM

## 2019-06-05 RX ORDER — SODIUM CHLORIDE, SODIUM LACTATE, POTASSIUM CHLORIDE, CALCIUM CHLORIDE 600; 310; 30; 20 MG/100ML; MG/100ML; MG/100ML; MG/100ML
INJECTION, SOLUTION INTRAVENOUS CONTINUOUS PRN
Status: DISCONTINUED | OUTPATIENT
Start: 2019-06-05 | End: 2019-06-05 | Stop reason: SURG

## 2019-06-05 RX ORDER — SODIUM CHLORIDE, SODIUM LACTATE, POTASSIUM CHLORIDE, CALCIUM CHLORIDE 600; 310; 30; 20 MG/100ML; MG/100ML; MG/100ML; MG/100ML
9 INJECTION, SOLUTION INTRAVENOUS CONTINUOUS
Status: DISCONTINUED | OUTPATIENT
Start: 2019-06-05 | End: 2019-06-05 | Stop reason: HOSPADM

## 2019-06-05 RX ORDER — NALOXONE HCL 0.4 MG/ML
0.2 VIAL (ML) INJECTION AS NEEDED
Status: DISCONTINUED | OUTPATIENT
Start: 2019-06-05 | End: 2019-06-05 | Stop reason: HOSPADM

## 2019-06-05 RX ORDER — MIDAZOLAM HYDROCHLORIDE 1 MG/ML
2 INJECTION INTRAMUSCULAR; INTRAVENOUS
Status: DISCONTINUED | OUTPATIENT
Start: 2019-06-05 | End: 2019-06-05 | Stop reason: HOSPADM

## 2019-06-05 RX ORDER — MEPERIDINE HYDROCHLORIDE 25 MG/ML
12.5 INJECTION INTRAMUSCULAR; INTRAVENOUS; SUBCUTANEOUS
Status: DISCONTINUED | OUTPATIENT
Start: 2019-06-05 | End: 2019-06-05 | Stop reason: HOSPADM

## 2019-06-05 RX ORDER — PROMETHAZINE HYDROCHLORIDE 25 MG/ML
6.25 INJECTION, SOLUTION INTRAMUSCULAR; INTRAVENOUS
Status: DISCONTINUED | OUTPATIENT
Start: 2019-06-05 | End: 2019-06-05 | Stop reason: HOSPADM

## 2019-06-05 RX ORDER — PROMETHAZINE HYDROCHLORIDE 25 MG/1
25 TABLET ORAL ONCE AS NEEDED
Status: DISCONTINUED | OUTPATIENT
Start: 2019-06-05 | End: 2019-06-05 | Stop reason: HOSPADM

## 2019-06-05 RX ADMIN — FAMOTIDINE 20 MG: 10 INJECTION INTRAVENOUS at 10:32

## 2019-06-05 RX ADMIN — PHENYLEPHRINE HYDROCHLORIDE 100 MCG: 10 INJECTION INTRAVENOUS at 11:04

## 2019-06-05 RX ADMIN — NEOSTIGMINE METHYLSULFATE 3 MG: 1 INJECTION INTRAMUSCULAR; INTRAVENOUS; SUBCUTANEOUS at 11:48

## 2019-06-05 RX ADMIN — GLYCOPYRROLATE 0.4 MG: 0.2 INJECTION INTRAMUSCULAR; INTRAVENOUS at 11:48

## 2019-06-05 RX ADMIN — SODIUM CHLORIDE, POTASSIUM CHLORIDE, SODIUM LACTATE AND CALCIUM CHLORIDE 9 ML/HR: 600; 310; 30; 20 INJECTION, SOLUTION INTRAVENOUS at 10:22

## 2019-06-05 RX ADMIN — CEFAZOLIN SODIUM 2 G: 2 INJECTION, SOLUTION INTRAVENOUS at 10:44

## 2019-06-05 RX ADMIN — PHENYLEPHRINE HYDROCHLORIDE 200 MCG: 10 INJECTION INTRAVENOUS at 11:15

## 2019-06-05 RX ADMIN — FENTANYL CITRATE 50 MCG: 50 INJECTION INTRAMUSCULAR; INTRAVENOUS at 10:55

## 2019-06-05 RX ADMIN — ONDANSETRON 4 MG: 2 INJECTION INTRAMUSCULAR; INTRAVENOUS at 11:41

## 2019-06-05 RX ADMIN — SODIUM CHLORIDE, POTASSIUM CHLORIDE, SODIUM LACTATE AND CALCIUM CHLORIDE: 600; 310; 30; 20 INJECTION, SOLUTION INTRAVENOUS at 11:38

## 2019-06-05 RX ADMIN — FENTANYL CITRATE 50 MCG: 50 INJECTION INTRAMUSCULAR; INTRAVENOUS at 10:48

## 2019-06-05 RX ADMIN — SODIUM CHLORIDE, POTASSIUM CHLORIDE, SODIUM LACTATE AND CALCIUM CHLORIDE: 600; 310; 30; 20 INJECTION, SOLUTION INTRAVENOUS at 09:46

## 2019-06-05 RX ADMIN — SCOPALAMINE 1 PATCH: 1 PATCH, EXTENDED RELEASE TRANSDERMAL at 10:33

## 2019-06-05 RX ADMIN — PHENYLEPHRINE HYDROCHLORIDE 100 MCG: 10 INJECTION INTRAVENOUS at 11:07

## 2019-06-05 RX ADMIN — Medication 2 MG: at 10:44

## 2019-06-05 RX ADMIN — HYDROCODONE BITARTRATE AND ACETAMINOPHEN 1 TABLET: 7.5; 325 TABLET ORAL at 13:04

## 2019-06-05 RX ADMIN — ONDANSETRON 4 MG: 4 TABLET, FILM COATED ORAL at 14:51

## 2019-06-05 RX ADMIN — MIDAZOLAM 1 MG: 1 INJECTION INTRAMUSCULAR; INTRAVENOUS at 10:32

## 2019-06-05 RX ADMIN — PROPOFOL 150 MG: 10 INJECTION, EMULSION INTRAVENOUS at 10:48

## 2019-06-05 RX ADMIN — DEXAMETHASONE SODIUM PHOSPHATE 6 MG: 10 INJECTION INTRAMUSCULAR; INTRAVENOUS at 10:48

## 2019-06-05 RX ADMIN — CEFAZOLIN SODIUM 1 G: 2 INJECTION, SOLUTION INTRAVENOUS at 11:39

## 2019-06-05 RX ADMIN — VASOPRESSIN 2 UNITS: 20 INJECTION INTRAMUSCULAR; SUBCUTANEOUS at 11:18

## 2019-06-05 RX ADMIN — HYDROMORPHONE HYDROCHLORIDE 0.5 MG: 1 INJECTION, SOLUTION INTRAMUSCULAR; INTRAVENOUS; SUBCUTANEOUS at 12:43

## 2019-06-05 RX ADMIN — LIDOCAINE HYDROCHLORIDE 100 MG: 20 INJECTION, SOLUTION INFILTRATION; PERINEURAL at 10:48

## 2019-06-05 RX ADMIN — ROCURONIUM BROMIDE 30 MG: 10 INJECTION INTRAVENOUS at 10:48

## 2019-06-05 NOTE — OP NOTE
Preop diagnosis: Herniated disc right L5-S1 with lumbar radiculopathy    Postop diagnosis: same    Procedure performed: Right L5-S1 laminectomy and discectomy using microsurgical technique microsurgical instrumentation and minimal access spinal technologies    Surgeon: En Edouard M.D.    Assistant: Suzanna Duran CFA who was instrumental in helping with visualization of neural structures, hemostasis, and retraction of neural structures.    Indications for the procedure:  This is a patient with severe pain in the legs.  Previous imaging had shown neural compression at the L5-S1 levels.  As a result of this and the failure of conservative therapy the patient elected to proceed with surgery.    Operative summary:  After induction of general anesthesia the patient was intubated and placed on the operating table in the prone position on a Juan Daniel table.  All pressure points were padded including peripheral points of entrapment.  The back was prepped with Chloraprep and then draped with Ioban, half sheets, and a split sheet.      The L5-S1 level was localized with intraoperative fluoroscopy an incision was made on the right just above the pedicle.  Successive dilating tubes up to 18 mm by 3 cm were placed over that area.  Soft tissue was then removed from the supralaminar space.  The inferior laminar arch of L5 as well as the superior laminar arch of S1 and the medial aspect of facet were removed with the Closet Couture drill.  The remainder of the operation was done under high-power magnification of the operating microscope using microsurgical technique and microsurgical instrumentation.  The ligamentum flavum was opened and removed out to the level of the pedicle using the Kerrison rongeurs.  This exposed the lateral thecal sac and the nerve root of S1.  Once the lateral recess was opened the dissection was carried up to the L5 pedicle completely decompressing the superior nerve root.    Once this was done the S1 nerve  root was mobilized medially to expose the disc.  There was evidence of a large disc herniation compressing the nerve just under the nerve root.  The disc had actually eroded through the dura over the nerve root.  The disc was carefully teased out and then the disc space was incised and disc material was removed with the down-biting cervical curettes and the pituitary rongeurs.  Once the disc space was emptied as much as possible bleeding was controlled with bipolar cautery.    Once the entire decompression was completed we were able to explore under the nerve root and the thecal sac using the Nettles ball probe to be sure there was no evidence of residual compression.there being none bleeding was controlled again using the bipolar cautery only.  Patient had an allergy to gelatin and so I elected not to use any thrombin and Gelfoam or FloSeal.  The area was then copiously irrigated with bacitracin solution and the tube was removed. The paraspinous musculature was injected with 100 cc 1/8% Marcaine with 1:200,000 epinephrine solution.    Another gram of Kefzol was given prior to closure.    The incision was then closed in layersdressed and the patient was taken to the recovery room in stable condition there were no apparent complications.  Sponge, instrument, and needle counts were correct at the end of the procedure.

## 2019-06-05 NOTE — ANESTHESIA PREPROCEDURE EVALUATION
Anesthesia Evaluation     Patient summary reviewed and Nursing notes reviewed   history of anesthetic complications: PONV               Airway   Mallampati: II  Small opening  Dental      Pulmonary    (+) a smoker Former,   Cardiovascular - negative cardio ROS        Neuro/Psych- negative ROS  GI/Hepatic/Renal/Endo - negative ROS     Musculoskeletal (-) negative ROS    Abdominal    Substance History - negative use     OB/GYN negative ob/gyn ROS         Other                        Anesthesia Plan    ASA 2     general     intravenous induction   Anesthetic plan, all risks, benefits, and alternatives have been provided, discussed and informed consent has been obtained with: patient.

## 2019-06-05 NOTE — ANESTHESIA PROCEDURE NOTES
Airway  Urgency: elective    Date/Time: 6/5/2019 10:50 AM  Airway not difficult    General Information and Staff    Patient location during procedure: OR  Anesthesiologist: Katie Saunders MD  CRNA: Darrell Marley CRNA    Indications and Patient Condition  Indications for airway management: airway protection    Preoxygenated: yes  MILS not maintained throughout  Mask difficulty assessment: 1 - vent by mask    Final Airway Details  Final airway type: endotracheal airway      Successful airway: ETT and reinforced tube  Cuffed: yes   Successful intubation technique: direct laryngoscopy  Endotracheal tube insertion site: oral  Blade: Howard  Blade size: 3  ETT size (mm): 7.0  Cormack-Lehane Classification: grade I - full view of glottis  Placement verified by: chest auscultation   Cuff volume (mL): 6  Measured from: lips  ETT to lips (cm): 20  Number of attempts at approach: 1    Additional Comments  Pre O2, SIAI

## 2019-06-05 NOTE — BRIEF OP NOTE
LUMBAR DISCECTOMY POSTERIOR WITH METRIX  Progress Note    Dina BROOKE Oswaldo  6/5/2019    Pre-op Diagnosis:   Neuritis or radiculitis due to rupture of lumbar intervertebral disc [M51.16]       Post-Op Diagnosis Codes:     * Neuritis or radiculitis due to rupture of lumbar intervertebral disc [M51.16]    Procedure/CPT® Codes:      Procedure(s):  Right L5-S1 laminectomy and discectomy with metrx    Surgeon(s):  En Edouard MD    Anesthesia: General    Staff:   Circulator: Karla Schneider RN  Scrub Person: Steve Roe  Assistant: Suzanna Duran CSA    Estimated Blood Loss: 100ml    Urine Voided: * No values recorded between 6/5/2019 10:40 AM and 6/5/2019 11:59 AM *    Specimens:                None          Drains:      Findings: Large herniated disc    Complications: None      En Edouard MD     Date: 6/5/2019  Time: 12:00 PM

## 2019-06-05 NOTE — DISCHARGE INSTRUCTIONS
SEDATION DISCHARGE INSTRUCTIONS.    IMPORTANT: The following information will help you return to your best level of health.  GENERAL ANESTHESIA.  You have had general anesthesia. You were given a medicine to help you go to sleep and not feel pain.    Follow these instructions:   Go right home. Rest quietly at home today, then you can be up and about.   Do not drink alcohol, drive or operate machinery for 24 hours.   Do not make any important decisions or sign any legal papers in the next 24 hours.   Have a RESPONSIBLE PERSON stay with you the rest of today and overnight for your protection and safety.   Start your diet with fluids and light foods (jello, soup, juice, toast). Then eat a normal diet if not nauseated.    Call your doctor if you have:   any blue or gray skin color.   repeated vomiting.   trouble breathing.   any new problems or concerns.    LUMBAR SURGERY - MILTON RUIZ M.D.  [address], [address]  [place] [address]  [phone]    Instructions & Care After Your Lumbar Surgery    1. No sitting except on the commode.  You may lie on a firm couch but not on a waterbed or recliner.  You may lie in any position that is comfortable, using only one pillow under your head. Either stand at a counter or lie on your side for meals. Three weeks after surgery you may begin sitting for 30 minutes 3 times per day.    2. No driving for three weeks.  You may ride in the car in a passenger seat that reclines or lying down in the back seat.      3. No bending. If you drop something allow someone else to pick it up.    4. Don’t lift anything heavier than a coffee cup or paperback book.    5. Gradually increase your activity each day.  You should get out of bed every hour during the day.  Walk outside as soon as you feel up to it.  Walk short distances frequently rather than making a long trip.  Frequency is more important than distance.  Your goal is to be walking 2 to 3 miles per day when you return for your  post-operative visit. (Never do this in one trip.)    6. You may climb stairs.    7. Remove your bandage the second day after surgery and leave it off.  If you notice any redness, swelling or drainage, call the office.  There are steri-strips across the incision.  If these are still present ten days after surgery, remove them gently.      8. You may shower five days after surgery.  Keep the incision dry until then.  Don’t let the water beat directly on the incision and gently pat it dry.    9. Physical Therapy will be arranged at your post-operative visit if needed.    10. Your prescription for pain medication may be filled for half the original amount prior to your return office visit.  Due to changes in Federal Law in order to have this medication refilled you must contact the office four days prior to the date and make arrangements to pick the prescription up in the office.  This prescription refill cannot be called in to the pharmacy. Your prescription will be ready for pick-up the day the refill is due.    Don’t be alarmed if you experience some of your pre-operative symptoms after going home.  This is not uncommon and normally goes away in a few days but may last longer.  If you have any questions or concerns, please call our office.

## 2019-06-05 NOTE — ANESTHESIA POSTPROCEDURE EVALUATION
Patient: Dina Chacon    Procedure Summary     Date:  06/05/19 Room / Location:  [facility] OR  / [facility] MAIN OR    Anesthesia Start:  1043 Anesthesia Stop:  1205    Procedure:  Right L5-S1 laminectomy and discectomy with metrx (Right Spine Lumbar) Diagnosis:       Neuritis or radiculitis due to rupture of lumbar intervertebral disc      (Neuritis or radiculitis due to rupture of lumbar intervertebral disc [M51.16])    Surgeon:  En Edouard MD Provider:  Katie Saunders MD    Anesthesia Type:  general ASA Status:  2          Anesthesia Type: general  Last vitals  BP   112/69 (06/05/19 1015)   Temp   36.5 °C (97.7 °F) (06/05/19 1201)   Pulse   92 (06/05/19 1201)   Resp   16 (06/05/19 1201)     SpO2   100 %(POST SEDATION ) (06/05/19 1035)     Post Anesthesia Care and Evaluation    Patient location during evaluation: PACU  Patient participation: complete - patient participated  Level of consciousness: awake and alert  Pain management: adequate  Airway patency: patent  Anesthetic complications: No anesthetic complications    Cardiovascular status: acceptable  Respiratory status: acceptable  Hydration status: acceptable    Comments: ---------------------------               06/05/19                      1201         ---------------------------   BP:                         Pulse:         92           Resp:          16           Temp:   36.5 °C (97.7 °F)   SpO2:                      ---------------------------

## 2019-06-05 NOTE — TELEPHONE ENCOUNTER
Patient's  called stating that patient just had spinal surgery and was requesting an antinausea medication. Per Dr. Mcgee send in Zofran 4 mg one q eight hours prn nv #21. Prescription sent to [facility]. Patient's  notified.

## 2019-06-07 ENCOUNTER — TELEPHONE (OUTPATIENT)
Dept: NEUROSURGERY | Facility: CLINIC | Age: 29
End: 2019-06-07

## 2019-06-07 NOTE — TELEPHONE ENCOUNTER
Patient is doing amazing after surgery. Her pain is great. She is still taking her pain medication. She is having some Blurry vision. She does not typically have any issues with her vision.

## 2019-06-07 NOTE — TELEPHONE ENCOUNTER
Patient is aware. She has an appointment with her PCP next week and will discuss it with them if it is still going on after stopping the pain medications.

## 2019-06-07 NOTE — TELEPHONE ENCOUNTER
Might have her back off of the pain medications and just switch to plain Tylenol.  Otherwise I do not think this is related to her surgery.

## 2019-06-13 ENCOUNTER — TELEPHONE (OUTPATIENT)
Dept: NEUROSURGERY | Facility: CLINIC | Age: 29
End: 2019-06-13

## 2019-06-13 ENCOUNTER — OFFICE VISIT (OUTPATIENT)
Dept: INTERNAL MEDICINE | Facility: CLINIC | Age: 29
End: 2019-06-13

## 2019-06-13 VITALS
TEMPERATURE: 97.7 F | OXYGEN SATURATION: 99 % | WEIGHT: 97.4 LBS | HEIGHT: 62 IN | DIASTOLIC BLOOD PRESSURE: 62 MMHG | SYSTOLIC BLOOD PRESSURE: 94 MMHG | BODY MASS INDEX: 17.92 KG/M2 | HEART RATE: 108 BPM

## 2019-06-13 DIAGNOSIS — E53.8 VITAMIN B 12 DEFICIENCY: ICD-10-CM

## 2019-06-13 DIAGNOSIS — Z98.890 STATUS POST LAMINECTOMY: Primary | ICD-10-CM

## 2019-06-13 PROCEDURE — 99213 OFFICE O/P EST LOW 20 MIN: CPT | Performed by: FAMILY MEDICINE

## 2019-06-13 RX ORDER — CYANOCOBALAMIN 1000 UG/ML
1000 INJECTION, SOLUTION INTRAMUSCULAR; SUBCUTANEOUS
Qty: 3 ML | Refills: 6 | Status: SHIPPED | OUTPATIENT
Start: 2019-06-13 | End: 2019-09-24 | Stop reason: SDUPTHER

## 2019-06-13 NOTE — PROGRESS NOTES
Subjective   Patient ID: Dina Chacon is a 28 y.o. female is here today for follow-up. She is 2 weeks out from an right L5-S1 laminectomy and discectomy with metrx by Dr. Edouard. She is doing well.  She no longer has any dizziness or swelling after stopping Hydrocodone.  She denies any incision problems. Mrs. Chacon takes 1/2 Robaxin 500 mg BID, Tylenol prn for pain.      Back Pain   The pain is at a severity of 0/10. The patient is experiencing no pain. Associated symptoms include leg pain and numbness.   Leg Pain    The pain is at a severity of 1/10. The pain is mild. Associated symptoms include numbness.       The following portions of the patient's history were reviewed and updated as appropriate: allergies, current medications, past family history, past medical history, past social history, past surgical history and problem list.    Review of Systems   Genitourinary: Negative for difficulty urinating.   Musculoskeletal: Positive for back pain.   Neurological: Positive for numbness.   All other systems reviewed and are negative.      Objective   Physical Exam   Neurological:   Incision okay     Neurologic Exam    Assessment/Plan   Independent Review of Radiographic Studies:      Medical Decision Making:    Ms. Chacon is 2 weeks out from a right L5-S1 laminectomy and discectomy.  She has done extremely well and no longer has any radicular symptoms.  Her incision is healing nicely.  She has some muscular discomfort around the incision and the right buttock which I reassured her is not unusual.  We discussed her restrictions.  She will stay off work as she works in  and has to drive quite a bit.  She will begin physical therapy.  She will follow-up in 4 weeks.  Dina was seen today for post-op.    Diagnoses and all orders for this visit:    Surgery follow-up examination  -     Ambulatory Referral to Physical Therapy Evaluate and treat (2-3 times per week for 4wks)      Return in about 4 weeks  (around 7/17/2019).

## 2019-06-13 NOTE — TELEPHONE ENCOUNTER
How are you feeling? Ok    Are you having any pain? Where? No, just discomfort in lower back. Incisional pain    Rate pain from 1-10 - 1    Are you taking the pain RX? No,taking ibuprofen, I encouraged her to take tylenol rather than the NSAID    Do you think it's helping? yes    Do you feel better than before surgery? yes    Dermabond OK? yes    Is you incision red, swollen or bleeding? Slight swelling but nothing concerning,     Are you having any trouble with nausea or constipation? Was constipated but better now. Woke up today nauseated but thinks she may have a bug.    Patient ended the phone call as she was at her primary care office for a possible stomach bug. She said she woke up this morning with an upset stomach and the feeling of chills and hot flashes. She states that her pain was gone in the recovery room but that the numbness has returned    Were your discharge instructions easy to understand?    Any other questions?    Confirm post op appt -

## 2019-06-19 ENCOUNTER — OFFICE VISIT (OUTPATIENT)
Dept: NEUROSURGERY | Facility: CLINIC | Age: 29
End: 2019-06-19

## 2019-06-19 VITALS
HEART RATE: 110 BPM | SYSTOLIC BLOOD PRESSURE: 98 MMHG | DIASTOLIC BLOOD PRESSURE: 68 MMHG | WEIGHT: 97 LBS | BODY MASS INDEX: 17.85 KG/M2 | HEIGHT: 62 IN

## 2019-06-19 DIAGNOSIS — Z09 SURGERY FOLLOW-UP EXAMINATION: Primary | ICD-10-CM

## 2019-06-19 PROBLEM — M54.50 SEVERE LOW BACK PAIN: Status: RESOLVED | Noted: 2019-04-11 | Resolved: 2019-06-19

## 2019-06-19 PROBLEM — M51.16 NEURITIS OR RADICULITIS DUE TO RUPTURE OF LUMBAR INTERVERTEBRAL DISC: Status: RESOLVED | Noted: 2019-04-30 | Resolved: 2019-06-19

## 2019-06-19 PROCEDURE — 99024 POSTOP FOLLOW-UP VISIT: CPT | Performed by: PHYSICIAN ASSISTANT

## 2019-06-23 PROBLEM — Z98.890 STATUS POST LAMINECTOMY: Status: ACTIVE | Noted: 2019-06-23

## 2019-06-23 NOTE — PROGRESS NOTES
Subjective   Dina Chacon is a 28 y.o. female.     Chief Complaint   Patient presents with   • Follow Up to Spine Surgery   • Chills   • Fever         History of Present Illness     Patient notes that she is following up after having surgery. She still has some pain from the surgery. Patient states at todays visit, she doesn't want to use the pain meds that was given. She states that she was told not to take any nsaids.     Patient does note to have vitamin b12 deficiency. She is currently getting injections monthly.     The following portions of the patient's history were reviewed and updated as appropriate: allergies, current medications, past family history, past medical history, past social history, past surgical history and problem list.    Review of Systems   Constitutional: Negative.    HENT: Negative.    Respiratory: Negative.    Cardiovascular: Negative.    Musculoskeletal: Positive for back pain.   Psychiatric/Behavioral: Negative.        Objective   Physical Exam   Constitutional: She is oriented to person, place, and time. She appears well-developed and well-nourished.   HENT:   Head: Normocephalic and atraumatic.   Eyes: EOM are normal.   Neck: Normal range of motion. Neck supple.   Neurological: She is alert and oriented to person, place, and time.   Skin:   Wound looks clean and dry.    Psychiatric: She has a normal mood and affect. Her behavior is normal.   Nursing note and vitals reviewed.      Assessment/Plan   Dina was seen today for follow up to spine surgery, chills and fever.    Diagnoses and all orders for this visit:    Status post laminectomy        -     Continue tylenol for pain. Can use norco which was prescribed to her. Follow up with neurosurgery.     Vitamin B 12 deficiency  -     cyanocobalamin 1000 MCG/ML injection; Inject 1 mL into the appropriate muscle as directed by prescriber Every 28 (Twenty-Eight) Days.          No Follow-up on file.    Dictated utilizing Dragon Voice  Recognition Software

## 2019-06-26 ENCOUNTER — TREATMENT (OUTPATIENT)
Dept: PHYSICAL THERAPY | Facility: CLINIC | Age: 29
End: 2019-06-26

## 2019-06-26 DIAGNOSIS — Z98.890 STATUS POST LAMINECTOMY: Primary | ICD-10-CM

## 2019-06-26 PROCEDURE — 97014 ELECTRIC STIMULATION THERAPY: CPT | Performed by: PHYSICAL THERAPIST

## 2019-06-26 PROCEDURE — 97161 PT EVAL LOW COMPLEX 20 MIN: CPT | Performed by: PHYSICAL THERAPIST

## 2019-06-26 PROCEDURE — 97110 THERAPEUTIC EXERCISES: CPT | Performed by: PHYSICAL THERAPIST

## 2019-06-26 NOTE — PROGRESS NOTES
Physical Therapy Initial Evaluation and Plan of Care    Patient: Dina Chacon   : 1990  Diagnosis/ICD-10 Code:  Status post laminectomy [Z98.890]  Referring practitioner: En Edouard MD    Subjective Evaluation    History of Present Illness  Date of surgery: weeks 3 wks   Mechanism of injury: Pt is a 29 y/o WF who reports to the clinic s/p L5-S1 lumbar laminectomy and discectomy 3 weeks ago.  Pt reports not having right LE pain immediately post-op, but states now the numbness in her right leg is back.  Pt states she has an extra vertebra in her lower back.  Pt reports having a several year Hx of intermittent LBP and treated it with seeing a Chiropractor.  Pt states this last episode started 3.5 months ago her LBP became progressive-went to Chiropractor-referred to PCP--imaging-referred to Dr. Edouard-scheduled surgery.  Pt denies having prior epidurals or PT for her back pain.  Pt has a follow up in 4 weeks.         Patient Occupation: -drives a lot    Precautions and Work Restrictions: do not lift anything over 10 lbs Quality of life: good    Pain  Current pain ratin  At worst pain ratin  Location: right buttock twitching, soreness in right leg   Quality: muscular discomfort, muscle twitching, random weakness, N/T right leg   Relieving factors: change in position, medications and ice (walking around to change positions )  Aggravating factors: movement, ambulation, standing and prolonged positioning    Hand dominance: right    Diagnostic Tests  X-ray: abnormal (moderate facet arthropathy, 6th lumbar vertebra )  MRI studies: abnormal (L4-L5 mild disc bulge, loss lordotic curve, L5-S1 bulging disc )    Treatments  Previous treatment: chiropractic  Current treatment: chiropractic  Current treatment comments: tens unit .     Patient Goals  Patient goals for therapy: decreased pain, increased motion, increased strength, independence with ADLs/IADLs, return to sport/leisure  activities and return to work  Patient goal: works out 4 days a week, spin class once per week            Objective       Postural Observations  Seated posture: good    Additional Postural Observation Details  Flattened lumbar lordosis   Level iliac crest     Palpation     Additional Palpation Details  Moderate tenderness R> L piriformis, gluteals, and greater trochanters     Tenderness     Left Hip   Tenderness in the PSIS.     Right Hip   Tenderness in the PSIS.     Neurological Testing     Sensation     Lumbar   Left   Intact: light touch    Right   Hypersensation: light touch    Comments   Right light touch: right L5-S1     Reflexes   Left   Patellar (L4): normal (2+)  Achilles (S1): normal (2+)    Right   Patellar (L4): normal (2+)  Achilles (S1): normal (2+)    Active Range of Motion     Lumbar   Flexion: 80 degrees   Extension: 25 degrees with pain  Left lateral flexion: 75 degrees   Right lateral flexion: 50 degrees with pain  Left rotation: WFL  Right rotation: WFL    Strength/Myotome Testing     Left Hip   Planes of Motion   Flexion: 5    Right Hip   Planes of Motion   Flexion: 5    Left Knee   Flexion: 5  Extension: 5    Right Knee   Flexion: 4  Extension: 4+    Left Ankle/Foot   Dorsiflexion: 5  Plantar flexion: 5  Eversion: 5  Great toe extension: 5    Right Ankle/Foot   Dorsiflexion: 5  Plantar flexion: 5  Eversion: 5  Great toe extension: 5    Additional Strength Details  Abdominal strength 3/5   Trunk Ext 4/5     Tests     Lumbar     Left   Negative crossed SLR, femoral stretch and passive SLR.     Right   Negative crossed SLR, femoral stretch and passive SLR.     Left Hip   Positive piriformis.   Negative Ely's, VALARIE and long sit.   90/90 SLR: Positive.   SLR: Positive.     Right Hip   Positive piriformis.   Negative Ely's, VALARIE and long sit.   90/90 SLR: Positive.   SLR: Positive.          Assessment & Plan     Assessment  Impairments: abnormal or restricted ROM, impaired physical strength, lacks  appropriate home exercise program and pain with function  Assessment details: Pt is a 29 y/o WF who reports to the clinic with LBP, tenderness, decreased flexibility, decreased core and trunk strength, decreased lumbar AROM, right hamstring weakness, right LE paraesthesia, and decreased functional mobility secondary to being s/p L5-S1 laminectomy.    Prognosis: good  Functional Limitations: carrying objects, lifting, walking, pulling, uncomfortable because of pain, sitting, standing and unable to perform repetitive tasks  Goals  Plan Goals: STGs: 2-3 weeks  1.  Decrease LBP to a 0-1/10 with sitting, standing, and ambulating.    2.  Increase lumbar AROM Flex and right SB to WNL with < or = 2/10 LBP and right LE pain.    3.  Decrease B piriformis and gluteal tenderness to minimal with palpation   4.  Increase B hamstring flexibility to minimal with 90-90 test   5.  Increase pt's tolerance to standing and sitting by 50% with < or = 2/10 pain/discomfort.       LTGs: 4-6 weeks  1.  Increase B hamstring and piriformis flexibility to WNL.    2.  Pt is independent with HEP  3.  Pt is able to complete her normal daily routine and return to her work outs with < or = 2/10 LBP and right LE pain.    4.  Increase pt's abdominal and trunk strength to 4-4+/5.  5.  Increase right hamstring strength to a 4+/5  6.  Decrease B piriformis tenderness to minimal to none with palpation       Plan  Therapy options: will be seen for skilled physical therapy services  Planned modality interventions: cryotherapy, electrical stimulation/Scottish stimulation, ultrasound, thermotherapy (hydrocollator packs) and iontophoresis  Planned therapy interventions: abdominal trunk stabilization, flexibility, functional ROM exercises, home exercise program, stretching, strengthening, spinal/joint mobilization and therapeutic activities  Duration in visits: 12  Treatment plan discussed with: patient        Manual Therapy:         mins  48078;  Therapeutic  Exercise:   20      mins  47770;     Neuromuscular Vlad:        mins  50647;    Therapeutic Activity:          mins  95841;     Gait Training:           mins  01559;     Ultrasound:          mins  65469;    Electrical Stimulation:    15     mins  61951 ( );  Dry Needling         mins self-pay    Timed Treatment:  20    mins   Total Treatment:     74   mins    PT SIGNATURE: Magali Jaramillo, PT   DATE TREATMENT INITIATED: 6/26/2019    Initial Certification  Certification Period: 9/24/2019  I certify that the therapy services are furnished while this patient is under my care.  The services outlined above are required by this patient, and will be reviewed every 90 days.     PHYSICIAN: En Edouard MD      DATE:     Please sign and return via fax to [phone].. Thank you, [facility].

## 2019-07-03 ENCOUNTER — TREATMENT (OUTPATIENT)
Dept: PHYSICAL THERAPY | Facility: CLINIC | Age: 29
End: 2019-07-03

## 2019-07-03 DIAGNOSIS — Z98.890 STATUS POST LAMINECTOMY: Primary | ICD-10-CM

## 2019-07-03 PROCEDURE — 97110 THERAPEUTIC EXERCISES: CPT | Performed by: PHYSICAL THERAPIST

## 2019-07-03 PROCEDURE — 97014 ELECTRIC STIMULATION THERAPY: CPT | Performed by: PHYSICAL THERAPIST

## 2019-07-03 NOTE — PROGRESS NOTES
"Physical Therapy Daily Progress Note    Visit # : 2  Dina Chacon reports: she was sore after last visit. Pt states she is also experiencing a \"weird/different\" sensation in her lower back and not sure what it is?  Pt states she is fine and denies having any N/T into her leg.      Subjective     Objective   See Exercise, Manual, and Modality Logs for complete treatment.       Assessment & Plan     Assessment  Assessment details: Pt is tolerating treatment fairly well, but did have some increased soreness after initial visit.  Pt needed a little cueing for proper technique with home stretches and stabilization exercises, but did well with maintaining PPT.  Added bridges today and pt did have some soreness into right hip.  Will continue to see 2x/week for stretching, strengthening, and modalities PRN for pain.          Progress per Plan of Care           Manual Therapy:         mins  05406;  Therapeutic Exercise:   30      mins  00252;     Neuromuscular Vlad:        mins  01906;    Therapeutic Activity:          mins  25902;     Gait Training:           mins  63257;     Ultrasound:          mins  46172;    Electrical Stimulation:  15       mins  09124 ( );  Dry Needling          mins self-pay    Timed Treatment:  30    mins   Total Treatment:     57   mins    Magali Jaramillo, PT  Physical Therapist  "

## 2019-07-08 ENCOUNTER — TREATMENT (OUTPATIENT)
Dept: PHYSICAL THERAPY | Facility: CLINIC | Age: 29
End: 2019-07-08

## 2019-07-08 DIAGNOSIS — Z98.890 STATUS POST LAMINECTOMY: Primary | ICD-10-CM

## 2019-07-08 PROCEDURE — 97014 ELECTRIC STIMULATION THERAPY: CPT | Performed by: PHYSICAL THERAPIST

## 2019-07-08 PROCEDURE — 97110 THERAPEUTIC EXERCISES: CPT | Performed by: PHYSICAL THERAPIST

## 2019-07-08 NOTE — PROGRESS NOTES
Physical Therapy Daily Progress Note        Patient: Dina Chacon   : 1990  Diagnosis/ICD-10 Code:  Status post laminectomy [Z98.890]  Referring practitioner: Анна Rocha PA-C  Date of Initial Visit: Type: THERAPY  Noted: 2019  Today's Date: 2019  Patient seen for 3 sessions         Dina Chacon reports: no problems after last session. She reported she may have trouble making appointments due to work conflicts.         Subjective     Objective   See Exercise, Manual, and Modality Logs for complete treatment.       Assessment/Plan  Pt progressed reps with multiple exercises as well as added additional strengthening with complaints only reported with standing hip ext. Pt instructed to limit ROM with standing hip ext or discontinue if pain free range could not be achieved.  Pt required moderate cueing during the session demonstrating poor carryover from previous sessions. Continued with estim to assist with pain control. Plan to issue updated HEP next session due to work conflicts to assist with carryover and continued progress towards goals despite possible absence.     Progress per Plan of Care           Manual Therapy:         mins  67278;  Therapeutic Exercise:    25     mins  73494;     Neuromuscular Vlad:        mins  50839;    Therapeutic Activity:          mins  22737;     Gait Training:           mins  78673;     Ultrasound:          mins  76213;    Electrical Stimulation:    15     mins  62858 ( );  Dry Needling          mins self-pay    Timed Treatment:   25   mins   Total Treatment:     50   mins    Livia Saunders PTA  Physical Therapist Assistant

## 2019-07-11 ENCOUNTER — TREATMENT (OUTPATIENT)
Dept: PHYSICAL THERAPY | Facility: CLINIC | Age: 29
End: 2019-07-11

## 2019-07-11 DIAGNOSIS — Z98.890 STATUS POST LAMINECTOMY: Primary | ICD-10-CM

## 2019-07-11 PROCEDURE — 97110 THERAPEUTIC EXERCISES: CPT | Performed by: PHYSICAL THERAPIST

## 2019-07-11 PROCEDURE — 97014 ELECTRIC STIMULATION THERAPY: CPT | Performed by: PHYSICAL THERAPIST

## 2019-07-11 NOTE — PROGRESS NOTES
Physical Therapy Daily Progress Note        Patient: Dina Chacon   : 1990  Diagnosis/ICD-10 Code:  Status post laminectomy [Z98.890]  Referring practitioner: Анна Rocha PA-C  Date of Initial Visit: Type: THERAPY  Noted: 2019  Today's Date: 2019  Patient seen for 4 sessions         Dina Chacon reports: no significant problems but she has muscular pain and soreness. She said she gets some tingling down her leg. She had some pain in her upper back but doesn't think that was from the surgery. She said she will be out of town the next 2 weeks due to work.         Subjective     Objective   See Exercise, Manual, and Modality Logs for complete treatment.     Tender to (R) glut med (denied tenderness to PSIS)    Assessment/Plan  Pt reported sciatic pain with standing hip ABD as well as intermittently throughout her day thus added sciatic nerve stretch and nerve gliding this date. She required decreased cueing this date compared to previous session however moderate cueing required throughout the session. Improved technique and stabalilty noted with quadruped exercises. She was issued updated HEP to complete while out of town for the next 2 weeks. No questions or concerns with program. Will reassess when she returns and progress or modify treatment as appropriate.     Progress per Plan of Care           Manual Therapy:         mins  22653;  Therapeutic Exercise:    30     mins  99478;     Neuromuscular Vlad:        mins  77378;    Therapeutic Activity:          mins  13060;     Gait Training:           mins  55069;     Ultrasound:          mins  31931;    Electrical Stimulation:    15     mins  79261 ( );  Dry Needling          mins self-pay    Timed Treatment:   30   mins   Total Treatment:     45   mins    Livia Saunders PTA  Physical Therapist Assistant

## 2019-07-22 NOTE — PROGRESS NOTES
Subjective   Patient ID: Dina Chacon is a 28 y.o. female is here today for follow-up. She is almost 2 months out from an right L5-S1 laminectomy and discectomy with metrx done on 6/5/2019.    Patient went to PT a couple times, but will resume again next week due to work schedule .  Patient denies low back or leg pain.  She has an odd sensation ( cannot describe), right leg and she has right leg weakness.  Patient denies N/T.  She has intermittent bilateral hip discomfort.  She is taking Robaxin 500 mg and Ibuprofen prn.    History of Present Illness    This patient returns today.  She is doing well overall.  She has almost no pain at all.  Her incision looks great.    The following portions of the patient's history were reviewed and updated as appropriate: allergies, current medications, past family history, past medical history, past social history, past surgical history and problem list.    Review of Systems   Musculoskeletal: Negative for back pain and gait problem.   Neurological: Positive for weakness. Negative for numbness.   All other systems reviewed and are negative.      Objective   Physical Exam   Constitutional: She is oriented to person, place, and time. She appears well-developed and well-nourished.   Neurological: She is oriented to person, place, and time.     Neurologic Exam     Mental Status   Oriented to person, place, and time.       Assessment/Plan   Independent Review of Radiographic Studies:      Medical Decision Making:      I told the patient that she can gradually return to normal activities.  She is to call me if any problems develop.  I told her to avoid strenuously heavy lifting.    Dina was seen today for extremity weakness and hip pain.    Diagnoses and all orders for this visit:    Surgery follow-up examination      Return if symptoms worsen or fail to improve.

## 2019-07-30 ENCOUNTER — OFFICE VISIT (OUTPATIENT)
Dept: NEUROSURGERY | Facility: CLINIC | Age: 29
End: 2019-07-30

## 2019-07-30 VITALS
HEIGHT: 62 IN | SYSTOLIC BLOOD PRESSURE: 100 MMHG | HEART RATE: 68 BPM | WEIGHT: 96 LBS | DIASTOLIC BLOOD PRESSURE: 64 MMHG | BODY MASS INDEX: 17.66 KG/M2

## 2019-07-30 DIAGNOSIS — Z09 SURGERY FOLLOW-UP EXAMINATION: Primary | ICD-10-CM

## 2019-07-30 PROCEDURE — 99024 POSTOP FOLLOW-UP VISIT: CPT | Performed by: NEUROLOGICAL SURGERY

## 2019-07-30 RX ORDER — IBUPROFEN 200 MG
200 TABLET ORAL EVERY 6 HOURS PRN
COMMUNITY
End: 2019-10-22

## 2019-07-31 RX ORDER — IBUPROFEN AND FAMOTIDINE 800; 26.6 MG/1; MG/1
TABLET, COATED ORAL
Qty: 90 TABLET | Refills: 0 | Status: SHIPPED | OUTPATIENT
Start: 2019-07-31 | End: 2019-08-29 | Stop reason: SDUPTHER

## 2019-08-29 RX ORDER — IBUPROFEN AND FAMOTIDINE 800; 26.6 MG/1; MG/1
TABLET, COATED ORAL
Qty: 30 TABLET | Refills: 0 | Status: SHIPPED | OUTPATIENT
Start: 2019-08-29 | End: 2019-08-29 | Stop reason: SDUPTHER

## 2019-08-30 RX ORDER — IBUPROFEN AND FAMOTIDINE 800; 26.6 MG/1; MG/1
TABLET, COATED ORAL
Qty: 90 TABLET | Refills: 0 | Status: SHIPPED | OUTPATIENT
Start: 2019-08-30 | End: 2019-09-11 | Stop reason: SDUPTHER

## 2019-09-11 RX ORDER — IBUPROFEN AND FAMOTIDINE 800; 26.6 MG/1; MG/1
TABLET, COATED ORAL
Qty: 90 TABLET | Refills: 0 | Status: SHIPPED | OUTPATIENT
Start: 2019-09-11 | End: 2019-10-22

## 2019-09-17 ENCOUNTER — OFFICE VISIT (OUTPATIENT)
Dept: INTERNAL MEDICINE | Facility: CLINIC | Age: 29
End: 2019-09-17

## 2019-09-17 VITALS
WEIGHT: 100.7 LBS | BODY MASS INDEX: 18.41 KG/M2 | SYSTOLIC BLOOD PRESSURE: 112 MMHG | HEART RATE: 94 BPM | OXYGEN SATURATION: 98 % | DIASTOLIC BLOOD PRESSURE: 64 MMHG

## 2019-09-17 DIAGNOSIS — Z23 NEED FOR INFLUENZA VACCINATION: ICD-10-CM

## 2019-09-17 DIAGNOSIS — J06.9 ACUTE URI: ICD-10-CM

## 2019-09-17 DIAGNOSIS — E53.8 VITAMIN B12 DEFICIENCY: Primary | ICD-10-CM

## 2019-09-17 DIAGNOSIS — F90.9 ATTENTION DEFICIT HYPERACTIVITY DISORDER (ADHD), UNSPECIFIED ADHD TYPE: ICD-10-CM

## 2019-09-17 PROCEDURE — 99214 OFFICE O/P EST MOD 30 MIN: CPT | Performed by: FAMILY MEDICINE

## 2019-09-17 PROCEDURE — 90471 IMMUNIZATION ADMIN: CPT | Performed by: FAMILY MEDICINE

## 2019-09-17 PROCEDURE — 90674 CCIIV4 VAC NO PRSV 0.5 ML IM: CPT | Performed by: FAMILY MEDICINE

## 2019-09-17 RX ORDER — BENZONATATE 100 MG/1
CAPSULE ORAL
COMMUNITY
Start: 2019-09-06 | End: 2019-09-17

## 2019-09-17 RX ORDER — AMOXICILLIN 875 MG/1
TABLET, COATED ORAL
COMMUNITY
Start: 2019-09-06 | End: 2019-09-17

## 2019-09-17 RX ORDER — PREDNISONE 20 MG/1
TABLET ORAL
COMMUNITY
Start: 2019-09-06 | End: 2019-09-17

## 2019-09-17 NOTE — PROGRESS NOTES
Subjective   Dina Chacon is a 29 y.o. female.     Chief Complaint   Patient presents with   • Follow-up     for ADHD   • Follow-up     for B12 deficiency    • Sinusitis     taking amoxil, but not helping         Sinusitis   Associated symptoms include coughing. Pertinent negatives include no chills, congestion, shortness of breath or sore throat.        She patient is a history of vitamin B12 deficiency.  Patient is currently taking 1000 mcg of vitamin B12 injected IM monthly.  Patient states that she is doing well with this.  She has not checked her levels in quite some time.  Patient states that she has missed last months dose.    She notes that she has a history of ADHD.  Is currently on Adderall extended release 20 mg daily.  This is been prescribed to her by her psychiatrist.  Patient states that she is doing well on this medication.    Patient notes that she has just finished her battling a sinus infection, however she does note that she thinks that is got into an upper respiratory infection.  Patient states that she was on amoxicillin as well as Tessalon Perles.  Patient states that she does not feel if she is fully 100% recovered.  She has some occasional wheezes.    The following portions of the patient's history were reviewed and updated as appropriate: allergies, current medications, past family history, past medical history, past social history, past surgical history and problem list.    Review of Systems   Constitutional: Negative for chills and fever.   HENT: Negative for congestion, rhinorrhea, sinus pain and sore throat.    Eyes: Negative for photophobia and visual disturbance.   Respiratory: Positive for cough and wheezing. Negative for chest tightness and shortness of breath.    Cardiovascular: Negative for chest pain and palpitations.   Gastrointestinal: Negative for diarrhea, nausea and vomiting.   Genitourinary: Negative for dysuria, frequency and urgency.   Skin: Negative for rash and  wound.   Neurological: Negative for dizziness and syncope.   Psychiatric/Behavioral: Negative for behavioral problems and confusion.       Objective   Physical Exam   Constitutional: She is oriented to person, place, and time. She appears well-developed and well-nourished.   HENT:   Head: Normocephalic and atraumatic.   Right Ear: External ear normal.   Left Ear: External ear normal.   Mouth/Throat: Oropharynx is clear and moist.   Eyes: EOM are normal.   Neck: Normal range of motion. Neck supple.   Cardiovascular: Normal rate, regular rhythm and normal heart sounds.   Pulmonary/Chest: Effort normal. No respiratory distress. She has wheezes.   Musculoskeletal: Normal range of motion.   Lymphadenopathy:     She has no cervical adenopathy.   Neurological: She is alert and oriented to person, place, and time.   Skin: Skin is warm.   Psychiatric: She has a normal mood and affect. Her behavior is normal.   Nursing note and vitals reviewed.      Assessment/Plan   Dina was seen today for follow-up, follow-up and sinusitis.    Diagnoses and all orders for this visit:    Vitamin B12 deficiency  -     Vitamin B12  -     Methylmalonic Acid, Serum  -     Vitamin B12 levels continue with b12 injections monthly.    Attention deficit hyperactivity disorder (ADHD), unspecified ADHD type        -     Continue Adderall extended release 20 mg daily.    Acute URI        -     Patient should finish amoxicillin.  Continue with Tessalon Perles as needed.  We will provide her with Bevespi sample to use.    Need for influenza vaccination  -     Flucelvax Quad=>4Years (PFS)          No Follow-up on file.    Dictated utilizing Dragon Voice Recognition Software

## 2019-09-24 DIAGNOSIS — E53.8 VITAMIN B 12 DEFICIENCY: ICD-10-CM

## 2019-09-24 LAB
Lab: ABNORMAL
METHYLMALONATE SERPL-SCNC: 408 NMOL/L (ref 0–378)
VIT B12 SERPL-MCNC: 285 PG/ML (ref 211–946)

## 2019-09-24 RX ORDER — CYANOCOBALAMIN 1000 UG/ML
1000 INJECTION, SOLUTION INTRAMUSCULAR; SUBCUTANEOUS
Qty: 3 ML | Refills: 6 | Status: SHIPPED | OUTPATIENT
Start: 2019-09-24

## 2019-09-24 NOTE — PROGRESS NOTES
Please inform the patient of the following abnormal results.  Continues to still have vitamin B12 deficiency.  Recommend monthly vitamin B12 injections.

## 2019-09-25 ENCOUNTER — TELEPHONE (OUTPATIENT)
Dept: INTERNAL MEDICINE | Facility: CLINIC | Age: 29
End: 2019-09-25

## 2019-10-22 ENCOUNTER — HOSPITAL ENCOUNTER (OUTPATIENT)
Dept: GENERAL RADIOLOGY | Facility: HOSPITAL | Age: 29
Discharge: HOME | End: 2019-10-22

## 2019-10-22 ENCOUNTER — OFFICE VISIT (OUTPATIENT)
Dept: INTERNAL MEDICINE | Facility: CLINIC | Age: 29
End: 2019-10-22

## 2019-10-22 ENCOUNTER — HOSPITAL ENCOUNTER (OUTPATIENT)
Dept: GENERAL RADIOLOGY | Facility: HOSPITAL | Age: 29
Discharge: HOME | End: 2019-10-22
Admitting: FAMILY MEDICINE

## 2019-10-22 VITALS
HEIGHT: 62 IN | RESPIRATION RATE: 16 BRPM | BODY MASS INDEX: 18.66 KG/M2 | DIASTOLIC BLOOD PRESSURE: 54 MMHG | HEART RATE: 95 BPM | OXYGEN SATURATION: 100 % | SYSTOLIC BLOOD PRESSURE: 96 MMHG | WEIGHT: 101.4 LBS

## 2019-10-22 DIAGNOSIS — G56.03 BILATERAL CARPAL TUNNEL SYNDROME: Primary | ICD-10-CM

## 2019-10-22 DIAGNOSIS — R29.898 DECREASED GRIP STRENGTH OF RIGHT HAND: ICD-10-CM

## 2019-10-22 DIAGNOSIS — R29.898 DECREASED GRIP STRENGTH OF LEFT HAND: ICD-10-CM

## 2019-10-22 PROCEDURE — 73130 X-RAY EXAM OF HAND: CPT

## 2019-10-22 PROCEDURE — 99214 OFFICE O/P EST MOD 30 MIN: CPT | Performed by: FAMILY MEDICINE

## 2019-10-22 PROCEDURE — 72040 X-RAY EXAM NECK SPINE 2-3 VW: CPT

## 2019-10-22 RX ORDER — DAPSONE 75 MG/G
GEL TOPICAL
COMMUNITY
Start: 2019-10-08

## 2019-10-22 RX ORDER — SPIRONOLACTONE 50 MG/1
TABLET, FILM COATED ORAL
COMMUNITY
Start: 2019-10-09 | End: 2021-03-31

## 2019-10-22 NOTE — PROGRESS NOTES
Subjective   Dina Chacon is a 29 y.o. female.     No chief complaint on file.    CC: Numbness in hands.    History of Present Illness     Patient presented today's office visit due to concern for decrease  strength in both of her hands.  This is been going on for over 7 months, she notes that she recently underwent back surgery about 5 months ago.  Since then this is been progressively getting worse.  During this time patient has underwent a switch is of new jobs.  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  Patient notes that her  strength has started decrease in decline over the several months.  Patient denies any acute trauma or prior injury to the cervical spine.  At today's office visit she states that there is swelling at some joints.  She does note that she has a family history of rheumatoid arthritis, and would like to get evaluated for that as well.    The following portions of the patient's history were reviewed and updated as appropriate: allergies, current medications, past family history, past medical history, past social history, past surgical history and problem list.    Review of Systems   Constitutional: Negative for chills and fever.   HENT: Negative for congestion, rhinorrhea, sinus pain and sore throat.    Eyes: Negative for photophobia and visual disturbance.   Respiratory: Negative for cough, chest tightness and shortness of breath.    Cardiovascular: Negative for chest pain and palpitations.   Gastrointestinal: Negative for diarrhea, nausea and vomiting.   Genitourinary: Negative for dysuria, frequency and urgency.   Skin: Negative for rash and wound.   Neurological: Negative for dizziness and syncope.   Psychiatric/Behavioral: Negative for behavioral problems and confusion.       Objective   Physical Exam   Constitutional: She is oriented to person, place, and time. She appears well-developed and well-nourished.   HENT:   Head:  Normocephalic and atraumatic.   Right Ear: External ear normal.   Left Ear: External ear normal.   Eyes: EOM are normal.   Neck: Normal range of motion. Neck supple.   Cardiovascular: Normal rate, regular rhythm and normal heart sounds.   Pulmonary/Chest: Effort normal and breath sounds normal. No respiratory distress.   Musculoskeletal: Normal range of motion.   Lymphadenopathy:     She has no cervical adenopathy.   Neurological: She is alert and oriented to person, place, and time.   +phalen test bilaterally, -tinnel sign bilateral.    Skin: Skin is warm.   Psychiatric: She has a normal mood and affect. Her behavior is normal.   Nursing note and vitals reviewed.      Assessment/Plan   Diagnoses and all orders for this visit:    Bilateral carpal tunnel syndrome  -     Ambulatory Referral to Hand Surgery  -     XR hand 3+ vw bilateral    Decreased  strength of left hand  -     XR Spine Cervical 2 or 3 View  -     XR hand 3+ vw bilateral  -     Uric acid  -     Antistreptolysin O screen  -     Rheumatoid factor  -     C-reactive protein  -     SAMARA  -     Sedimentation Rate  -     Comprehensive Metabolic Panel  -     CBC & Differential    Decreased  strength of right hand  -     XR Spine Cervical 2 or 3 View  -     XR hand 3+ vw bilateral  -     Uric acid  -     Antistreptolysin O screen  -     Rheumatoid factor  -     C-reactive protein  -     SAMARA  -     Sedimentation Rate  -     Comprehensive Metabolic Panel  -     CBC & Differential    At today's office I discussed with patient that I am concerned that she may have bilateral carpal tunnel syndrome.  On physical exam at today's office visit she is positive Phalen's test in both wrists.  Her history does appear to be more supportive for carpal tunnel.  At present time I recommended patient getting wrist splints and wearing them in both of her wrist, and I would recommend 24 hours with the wear at least for the first week.  Following that she can wear them  nightly.  In the meantime I will put referral in for patient to see hand specialist as well.  Due to patient's concern for rheumatoid arthritis, and family history of this, I will also order several labs for rheumatoid arthritis.  I will also include x-rays of the hand bilaterally.  I will also get a x-ray of the C-spine to rule out any particular injury to the neck.  Patient can take anti-inflammatory such as NSAIDs to help her with her pain.  I do recommend rest to help as well.      I have spent greater than 25 mins with patient at todays visit, with more than half of the time spent in counselling patient about disease pathology and pharmacology used to treat the disease.     No Follow-up on file.    Dictated utilizing Dragon Voice Recognition Software

## 2019-10-23 LAB
ALBUMIN SERPL-MCNC: 5.1 G/DL (ref 3.5–5.5)
ALBUMIN/GLOB SERPL: 2.4 {RATIO} (ref 1.2–2.2)
ALP SERPL-CCNC: 69 IU/L (ref 39–117)
ALT SERPL-CCNC: 17 IU/L (ref 0–32)
ANA SER QL: NEGATIVE
AST SERPL-CCNC: 26 IU/L (ref 0–40)
BASOPHILS # BLD AUTO: 0 X10E3/UL (ref 0–0.2)
BASOPHILS NFR BLD AUTO: 0 %
BILIRUB SERPL-MCNC: 0.3 MG/DL (ref 0–1.2)
BUN SERPL-MCNC: 8 MG/DL (ref 6–20)
BUN/CREAT SERPL: 13 (ref 9–23)
CALCIUM SERPL-MCNC: 10.2 MG/DL (ref 8.7–10.2)
CHLORIDE SERPL-SCNC: 103 MMOL/L (ref 96–106)
CO2 SERPL-SCNC: 21 MMOL/L (ref 20–29)
CREAT SERPL-MCNC: 0.64 MG/DL (ref 0.57–1)
CRP SERPL-MCNC: <1 MG/L (ref 0–10)
EOSINOPHIL # BLD AUTO: 0.1 X10E3/UL (ref 0–0.4)
EOSINOPHIL NFR BLD AUTO: 1 %
ERYTHROCYTE [DISTWIDTH] IN BLOOD BY AUTOMATED COUNT: 13 % (ref 12.3–15.4)
ERYTHROCYTE [SEDIMENTATION RATE] IN BLOOD BY WESTERGREN METHOD: 4 MM/HR (ref 0–32)
GLOBULIN SER CALC-MCNC: 2.1 G/DL (ref 1.5–4.5)
GLUCOSE SERPL-MCNC: 88 MG/DL (ref 65–99)
HCT VFR BLD AUTO: 41.4 % (ref 34–46.6)
HGB BLD-MCNC: 14.5 G/DL (ref 11.1–15.9)
IMM GRANULOCYTES # BLD AUTO: 0 X10E3/UL (ref 0–0.1)
IMM GRANULOCYTES NFR BLD AUTO: 0 %
LYMPHOCYTES # BLD AUTO: 2.6 X10E3/UL (ref 0.7–3.1)
LYMPHOCYTES NFR BLD AUTO: 25 %
MCH RBC QN AUTO: 34.1 PG (ref 26.6–33)
MCHC RBC AUTO-ENTMCNC: 35 G/DL (ref 31.5–35.7)
MCV RBC AUTO: 97 FL (ref 79–97)
MONOCYTES # BLD AUTO: 0.5 X10E3/UL (ref 0.1–0.9)
MONOCYTES NFR BLD AUTO: 5 %
NEUTROPHILS # BLD AUTO: 7.2 X10E3/UL (ref 1.4–7)
NEUTROPHILS NFR BLD AUTO: 69 %
PLATELET # BLD AUTO: 382 X10E3/UL (ref 150–450)
POTASSIUM SERPL-SCNC: 4.3 MMOL/L (ref 3.5–5.2)
PROT SERPL-MCNC: 7.2 G/DL (ref 6–8.5)
RBC # BLD AUTO: 4.25 X10E6/UL (ref 3.77–5.28)
SODIUM SERPL-SCNC: 141 MMOL/L (ref 134–144)
URATE SERPL-MCNC: 3.4 MG/DL (ref 2.5–7.1)
WBC # BLD AUTO: 10.5 X10E3/UL (ref 3.4–10.8)

## 2019-10-24 RX ORDER — IBUPROFEN AND FAMOTIDINE 800; 26.6 MG/1; MG/1
TABLET, COATED ORAL
Qty: 90 TABLET | Refills: 0 | Status: SHIPPED | OUTPATIENT
Start: 2019-10-24 | End: 2019-10-25 | Stop reason: SDUPTHER

## 2019-10-25 RX ORDER — IBUPROFEN AND FAMOTIDINE 800; 26.6 MG/1; MG/1
TABLET, COATED ORAL
Qty: 90 TABLET | Refills: 0 | Status: SHIPPED | OUTPATIENT
Start: 2019-10-25 | End: 2019-11-21 | Stop reason: SDUPTHER

## 2019-11-05 ENCOUNTER — TELEPHONE (OUTPATIENT)
Dept: NEUROSURGERY | Facility: CLINIC | Age: 29
End: 2019-11-05

## 2019-11-05 NOTE — TELEPHONE ENCOUNTER
No this would not be related to her lumbar surgery at all.  Tell her to see what the hand surgeon says and let us know.

## 2019-11-05 NOTE — TELEPHONE ENCOUNTER
Patient called with some concerns. She is having swelling in her bilateral hands. She says that they are turning purple and she is dropping things constantly. She has an appointment with Dr. Zamora tomorrow. She is 5 months out from having a right L5-S1 laminectomy and discectomy with metrx done on 6/5/2019. She would like to know if this could be related to her nerve damage from her lumbar surgery.

## 2019-11-11 ENCOUNTER — APPOINTMENT (OUTPATIENT)
Dept: CT IMAGING | Facility: HOSPITAL | Age: 29
End: 2019-11-11

## 2019-11-11 ENCOUNTER — HOSPITAL ENCOUNTER (EMERGENCY)
Facility: HOSPITAL | Age: 29
Discharge: HOME | End: 2019-11-12
Attending: EMERGENCY MEDICINE | Admitting: EMERGENCY MEDICINE

## 2019-11-11 ENCOUNTER — APPOINTMENT (OUTPATIENT)
Dept: GENERAL RADIOLOGY | Facility: HOSPITAL | Age: 29
End: 2019-11-11

## 2019-11-11 DIAGNOSIS — R07.89 ATYPICAL CHEST PAIN: Primary | ICD-10-CM

## 2019-11-11 DIAGNOSIS — R06.00 DYSPNEA, UNSPECIFIED TYPE: ICD-10-CM

## 2019-11-11 LAB
ALBUMIN SERPL-MCNC: 4.4 G/DL (ref 3.5–5.2)
ALBUMIN/GLOB SERPL: 1.6 G/DL
ALP SERPL-CCNC: 61 U/L (ref 39–117)
ALT SERPL W P-5'-P-CCNC: 16 U/L (ref 1–33)
ANION GAP SERPL CALCULATED.3IONS-SCNC: 5.4 MMOL/L (ref 5–15)
AST SERPL-CCNC: 20 U/L (ref 1–32)
BASOPHILS # BLD AUTO: 0.05 10*3/MM3 (ref 0–0.2)
BASOPHILS NFR BLD AUTO: 0.5 % (ref 0–1.5)
BILIRUB SERPL-MCNC: <0.2 MG/DL (ref 0.2–1.2)
BUN BLD-MCNC: 8 MG/DL (ref 6–20)
BUN/CREAT SERPL: 13.8 (ref 7–25)
CALCIUM SPEC-SCNC: 9.6 MG/DL (ref 8.6–10.5)
CHLORIDE SERPL-SCNC: 103 MMOL/L (ref 98–107)
CO2 SERPL-SCNC: 31.6 MMOL/L (ref 22–29)
CREAT BLD-MCNC: 0.58 MG/DL (ref 0.57–1)
D DIMER PPP FEU-MCNC: 0.51 MCGFEU/ML (ref 0–0.49)
DEPRECATED RDW RBC AUTO: 44.5 FL (ref 37–54)
EOSINOPHIL # BLD AUTO: 0.09 10*3/MM3 (ref 0–0.4)
EOSINOPHIL NFR BLD AUTO: 0.9 % (ref 0.3–6.2)
ERYTHROCYTE [DISTWIDTH] IN BLOOD BY AUTOMATED COUNT: 12.4 % (ref 12.3–15.4)
GFR SERPL CREATININE-BSD FRML MDRD: 123 ML/MIN/1.73
GLOBULIN UR ELPH-MCNC: 2.7 GM/DL
GLUCOSE BLD-MCNC: 103 MG/DL (ref 65–99)
HCG SERPL QL: NEGATIVE
HCT VFR BLD AUTO: 37.9 % (ref 34–46.6)
HGB BLD-MCNC: 13.6 G/DL (ref 12–15.9)
IMM GRANULOCYTES # BLD AUTO: 0.04 10*3/MM3 (ref 0–0.05)
IMM GRANULOCYTES NFR BLD AUTO: 0.4 % (ref 0–0.5)
LYMPHOCYTES # BLD AUTO: 2.91 10*3/MM3 (ref 0.7–3.1)
LYMPHOCYTES NFR BLD AUTO: 29.8 % (ref 19.6–45.3)
MCH RBC QN AUTO: 35.6 PG (ref 26.6–33)
MCHC RBC AUTO-ENTMCNC: 35.9 G/DL (ref 31.5–35.7)
MCV RBC AUTO: 99.2 FL (ref 79–97)
MONOCYTES # BLD AUTO: 0.46 10*3/MM3 (ref 0.1–0.9)
MONOCYTES NFR BLD AUTO: 4.7 % (ref 5–12)
NEUTROPHILS # BLD AUTO: 6.22 10*3/MM3 (ref 1.7–7)
NEUTROPHILS NFR BLD AUTO: 63.7 % (ref 42.7–76)
NRBC BLD AUTO-RTO: 0 /100 WBC (ref 0–0.2)
PLATELET # BLD AUTO: 313 10*3/MM3 (ref 140–450)
PMV BLD AUTO: 8.9 FL (ref 6–12)
POTASSIUM BLD-SCNC: 3.5 MMOL/L (ref 3.5–5.2)
PROT SERPL-MCNC: 7.1 G/DL (ref 6–8.5)
RBC # BLD AUTO: 3.82 10*6/MM3 (ref 3.77–5.28)
SODIUM BLD-SCNC: 140 MMOL/L (ref 136–145)
TROPONIN T SERPL-MCNC: <0.01 NG/ML (ref 0–0.03)
WBC NRBC COR # BLD: 9.77 10*3/MM3 (ref 3.4–10.8)

## 2019-11-11 PROCEDURE — 93010 ELECTROCARDIOGRAM REPORT: CPT | Performed by: INTERNAL MEDICINE

## 2019-11-11 PROCEDURE — 71046 X-RAY EXAM CHEST 2 VIEWS: CPT

## 2019-11-11 PROCEDURE — 84703 CHORIONIC GONADOTROPIN ASSAY: CPT | Performed by: PHYSICIAN ASSISTANT

## 2019-11-11 PROCEDURE — 80053 COMPREHEN METABOLIC PANEL: CPT | Performed by: PHYSICIAN ASSISTANT

## 2019-11-11 PROCEDURE — 0 IOPAMIDOL PER 1 ML: Performed by: EMERGENCY MEDICINE

## 2019-11-11 PROCEDURE — 99283 EMERGENCY DEPT VISIT LOW MDM: CPT

## 2019-11-11 PROCEDURE — 85379 FIBRIN DEGRADATION QUANT: CPT | Performed by: PHYSICIAN ASSISTANT

## 2019-11-11 PROCEDURE — 84484 ASSAY OF TROPONIN QUANT: CPT | Performed by: PHYSICIAN ASSISTANT

## 2019-11-11 PROCEDURE — 93005 ELECTROCARDIOGRAM TRACING: CPT | Performed by: PHYSICIAN ASSISTANT

## 2019-11-11 PROCEDURE — 71275 CT ANGIOGRAPHY CHEST: CPT

## 2019-11-11 PROCEDURE — 85025 COMPLETE CBC W/AUTO DIFF WBC: CPT | Performed by: PHYSICIAN ASSISTANT

## 2019-11-11 PROCEDURE — 36415 COLL VENOUS BLD VENIPUNCTURE: CPT

## 2019-11-11 RX ORDER — SODIUM CHLORIDE 0.9 % (FLUSH) 0.9 %
10 SYRINGE (ML) INJECTION AS NEEDED
Status: DISCONTINUED | OUTPATIENT
Start: 2019-11-11 | End: 2019-11-12 | Stop reason: HOSPADM

## 2019-11-11 RX ADMIN — IOPAMIDOL 95 ML: 755 INJECTION, SOLUTION INTRAVENOUS at 23:55

## 2019-11-12 VITALS
OXYGEN SATURATION: 98 % | HEART RATE: 75 BPM | TEMPERATURE: 97.8 F | SYSTOLIC BLOOD PRESSURE: 122 MMHG | HEIGHT: 62 IN | WEIGHT: 97 LBS | RESPIRATION RATE: 18 BRPM | BODY MASS INDEX: 17.85 KG/M2 | DIASTOLIC BLOOD PRESSURE: 83 MMHG

## 2019-11-12 NOTE — ED TRIAGE NOTES
Pt to ED from home with c/o chest tightness that started 3 hours PTA, pt denies any cardiac hx, pt denies N/V/D fever. Pt also c/o discoloration to BUE, BLE, and bilateral breasts. Pt also c/o swelling to BUE, BLE. Pt states she is being seen for symptoms but chest tightness and discoloration are new. Pt described discoloration as blur, white, purplish. This nurse felt bilateral radial pulses which were strong WNL.

## 2019-11-12 NOTE — ED PROVIDER NOTES
"EMERGENCY DEPARTMENT ENCOUNTER    Room Number:  15/15  Date seen:  2019  Time seen: 10:21 PM  PCP: Juan Jose Mcgee MD  Historian: patient      HPI:  Chief complaint: chest \"tightness\"  Context: Dina Chacon is a 29 y.o. female who presents to the ED c/o intermittent episodes of chest \"tightness\" beginning a few weeks ago with most recent episode ocuring again 3 hours PTA and which last a few hours. Pt also complains of BLE edema, skin discoloration to BUE, BLE, bilateral hand swelling, SOA, \"flashes of color in eyes\", neck pain, congestion and productive cough. Pt reports hx of circulatory problems. Pt denies being on birth control. 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      MEDICAL RECORD REVIEW   She had laminectomy and discectomy 2019    ALLERGIES  Gelatin and Sulfa antibiotics    PAST MEDICAL HISTORY  Active Ambulatory Problems     Diagnosis Date Noted   • Bursitis of left foot 2017   • Pain of toe of left foot 2017   • Adnexal mass 2019   • Surgery follow-up examination 2019   • Status post laminectomy 2019   • ADHD (attention deficit hyperactivity disorder) 2019   • Vitamin B12 deficiency 2019     Resolved Ambulatory Problems     Diagnosis Date Noted   • Severe low back pain 2019   • Neuritis or radiculitis due to rupture of lumbar intervertebral disc 2019     Past Medical History:   Diagnosis Date   • ADHD (attention deficit hyperactivity disorder)    • Allergic    • Head ache    • Injury of back    • Low back pain    • Numbness and tingling    • PONV (postoperative nausea and vomiting)    • Right leg pain        PAST SURGICAL HISTORY  Past Surgical History:   Procedure Laterality Date   • BREAST SURGERY  2009    augmentation   • BREAST SURGERY  2016    REPAIR (LEAK)   •  SECTION     •  SECTION     •  SECTION     • COLPOSCOPY     • LEEP     • LUMBAR DISCECTOMY Right 2019 " "   Procedure: Right L5-S1 laminectomy and discectomy with metrx;  Surgeon: En Edouard MD;  Location: [facility] OR;  Service: Neurosurgery   • NOSE SURGERY  1993    NASAL RECONSTRUCTION   • RHINOPLASTY  2017    RHINO/SEPTOPLASTY       FAMILY HISTORY  Family History   Problem Relation Age of Onset   • Multiple sclerosis Other    • Malig Hyperthermia Neg Hx        SOCIAL HISTORY  Social History     Socioeconomic History   • Marital status:      Spouse name: Not on file   • Number of children: Not on file   • Years of education: Not on file   • Highest education level: Not on file   Tobacco Use   • Smoking status: Current Every Day Smoker     Packs/day: 0.50     Types: Cigarettes   • Smokeless tobacco: Never Used   Substance and Sexual Activity   • Alcohol use: Yes     Comment: social drinker   • Drug use: No           REVIEW OF SYSTEMS  Review of Systems   Constitutional: Negative for fever.   HENT: Positive for congestion. Negative for sore throat.    Eyes: Positive for visual disturbance (\"flashes of color\" ).   Respiratory: Positive for cough (productive) and shortness of breath.    Cardiovascular: Positive for chest pain and leg swelling.   Gastrointestinal: Negative for abdominal pain, diarrhea and vomiting.   Genitourinary: Negative for dysuria.   Musculoskeletal: Positive for neck pain.        Bilateral hand swelling   Skin: Positive for color change (NUE, BLE, bilateral breasts). Negative for rash.   Allergic/Immunologic: Negative.    Neurological: Negative for weakness, numbness and headaches.   Hematological: Negative.    Psychiatric/Behavioral: Negative.    All other systems reviewed and are negative.          PHYSICAL EXAM  ED Triage Vitals [11/11/19 2135]   Temp Heart Rate Resp BP SpO2   97.8 °F (36.6 °C) 105 18 -- 99 %      Temp src Heart Rate Source Patient Position BP Location FiO2 (%)   Tympanic -- -- -- --     Physical Exam   Constitutional: She is oriented to person, place, and time. " No distress.   HENT:   Head: Normocephalic and atraumatic.   Eyes: EOM are normal. Pupils are equal, round, and reactive to light.   Neck: Normal range of motion. Neck supple.   Cardiovascular: Normal rate, regular rhythm, normal heart sounds and normal pulses.   Pulmonary/Chest: Effort normal and breath sounds normal. No respiratory distress.   Abdominal: Soft. There is no tenderness. There is no rebound and no guarding.   Musculoskeletal: Normal range of motion. She exhibits no edema.   Neurological: She is alert and oriented to person, place, and time. She has normal sensation and normal strength.   Skin: Skin is warm and dry. No rash noted.   Mild hyperpigmentation of finger bilaterally   Psychiatric: Mood and affect normal.   Nursing note and vitals reviewed.        LAB RESULTS  Recent Results (from the past 24 hour(s))   Comprehensive Metabolic Panel    Collection Time: 11/11/19 10:36 PM   Result Value Ref Range    Glucose 103 (H) 65 - 99 mg/dL    BUN 8 6 - 20 mg/dL    Creatinine 0.58 0.57 - 1.00 mg/dL    Sodium 140 136 - 145 mmol/L    Potassium 3.5 3.5 - 5.2 mmol/L    Chloride 103 98 - 107 mmol/L    CO2 31.6 (H) 22.0 - 29.0 mmol/L    Calcium 9.6 8.6 - 10.5 mg/dL    Total Protein 7.1 6.0 - 8.5 g/dL    Albumin 4.40 3.50 - 5.20 g/dL    ALT (SGPT) 16 1 - 33 U/L    AST (SGOT) 20 1 - 32 U/L    Alkaline Phosphatase 61 39 - 117 U/L    Total Bilirubin <0.2 (L) 0.2 - 1.2 mg/dL    eGFR Non African Amer 123 >60 mL/min/1.73    Globulin 2.7 gm/dL    A/G Ratio 1.6 g/dL    BUN/Creatinine Ratio 13.8 7.0 - 25.0    Anion Gap 5.4 5.0 - 15.0 mmol/L   hCG, Serum, Qualitative    Collection Time: 11/11/19 10:36 PM   Result Value Ref Range    HCG Qualitative Negative Negative   Troponin    Collection Time: 11/11/19 10:36 PM   Result Value Ref Range    Troponin T <0.010 0.000 - 0.030 ng/mL   D-dimer, Quantitative    Collection Time: 11/11/19 10:36 PM   Result Value Ref Range    D-Dimer, Quantitative 0.51 (H) 0.00 - 0.49 MCGFEU/mL    CBC Auto Differential    Collection Time: 11/11/19 10:36 PM   Result Value Ref Range    WBC 9.77 3.40 - 10.80 10*3/mm3    RBC 3.82 3.77 - 5.28 10*6/mm3    Hemoglobin 13.6 12.0 - 15.9 g/dL    Hematocrit 37.9 34.0 - 46.6 %    MCV 99.2 (H) 79.0 - 97.0 fL    MCH 35.6 (H) 26.6 - 33.0 pg    MCHC 35.9 (H) 31.5 - 35.7 g/dL    RDW 12.4 12.3 - 15.4 %    RDW-SD 44.5 37.0 - 54.0 fl    MPV 8.9 6.0 - 12.0 fL    Platelets 313 140 - 450 10*3/mm3    Neutrophil % 63.7 42.7 - 76.0 %    Lymphocyte % 29.8 19.6 - 45.3 %    Monocyte % 4.7 (L) 5.0 - 12.0 %    Eosinophil % 0.9 0.3 - 6.2 %    Basophil % 0.5 0.0 - 1.5 %    Immature Grans % 0.4 0.0 - 0.5 %    Neutrophils, Absolute 6.22 1.70 - 7.00 10*3/mm3    Lymphocytes, Absolute 2.91 0.70 - 3.10 10*3/mm3    Monocytes, Absolute 0.46 0.10 - 0.90 10*3/mm3    Eosinophils, Absolute 0.09 0.00 - 0.40 10*3/mm3    Basophils, Absolute 0.05 0.00 - 0.20 10*3/mm3    Immature Grans, Absolute 0.04 0.00 - 0.05 10*3/mm3    nRBC 0.0 0.0 - 0.2 /100 WBC       I ordered the above labs and reviewed the results        RADIOLOGY  XR Chest 2 View   Preliminary Result   1. No active disease.              CT Angiogram Chest    (Results Pending)       I ordered the above noted radiological studies and reviewed the images on the PACS system.         MEDICATIONS GIVEN IN ER  Medications   sodium chloride 0.9 % flush 10 mL (not administered)   iopamidol (ISOVUE-370) 76 % injection 100 mL (95 mL Intravenous Given by Other 11/11/19 5269)           EKG  EKG time: 2230  Rhythm/Rate: NSR 76  P waves and FL: normal  QRS, axis: normal   ST and T waves: normal     Interpreted Contemporaneously by me, independently viewed  No prior to compare      PROCEDURES  Procedures        COURSE & MEDICAL DECISION MAKING  Pertinent Labs and Imaging studies that were ordered and reviewed are noted above.  Results were reviewed/discussed with the patient and they were also made aware of online access.  Pt also made aware that some labs, such  "as cultures, will not be resulted during ER visit and follow up with PMD is necessary.         PROGRESS AND CONSULTS    Progress Notes:    2300 Reviewed pt's history and workup with Dr. Clemente (ER physician).  After a bedside evaluation, Dr. Clemente agrees with the plan of care.    2315 Patient with elevated d-dimer.  We will order CTA of chest for further evaluation.  Patient agrees with plan.      0000 Care turned over to Dr. Clemente pending further evaluation.      Disposition vitals:  /73   Pulse 105   Temp 97.8 °F (36.6 °C) (Tympanic)   Resp 18   Ht 157.5 cm (62\")   Wt 44 kg (97 lb)   LMP 10/28/2019   SpO2 99%   BMI 17.74 kg/m²         DIAGNOSIS  Final diagnoses:   None         DISPOSITION  Pending      FOLLOW UP   No follow-up provider specified.        RX     Medication List      No changes were made to your prescriptions during this visit.         Documentation assistance provided by nora Gibbs for Jason Davenport PA-C.  Information recorded by the scribe was done at my direction and has been verified and validated by me.                    Joanne Gibbs  11/11/19 2235       Jason Davenport PA  11/12/19 0003    "

## 2019-11-12 NOTE — ED PROVIDER NOTES
MD ATTESTATION NOTE    Pt presents to the ED complaining of intermittent chest pressure that began a few weeks ago and worsened a few hours ago. She reports associated SOA, skin discoloration to her extremities, congestion, and productive cough.     On exam, the pt is resting comfortably, in no distress, and without focal neurologic deficit. Lungs CTAB. Cardiovascular exam is within normal limits without murmur. No skin discoloration.    0000 Assumed care of patient from Jason Davenport PA-C.    0054 Initial encounter. BP- 107/75 HR- 64 Temp- 97.8 °F (36.6 °C) (Tympanic) O2 sat- 98% Informed the patient of all normal labs and imaging. Discussed the plan to discharge home with instructions to follow-up with PCP. Pt understands and agrees with the plan, all questions answered.    The MARY and I have discussed this patient's history, physical exam, and treatment plan.  I have reviewed the documentation and personally had a face to face interaction with the patient. I affirm the documentation and agree with the treatment and plan.  The attached note describes my personal findings.    DIAGNOSIS  Final diagnoses:   Atypical chest pain   Dyspnea, unspecified type     DISPOSITION  DISCHARGE    Patient discharged in stable condition.    Reviewed implications of results, diagnosis, meds, responsibility to follow up, warning signs and symptoms of possible worsening, potential complications and reasons to return to ER, including any new or worsening symptoms.    Patient/Family voiced understanding of above instructions.    Discussed plan for discharge, as there is no emergent indication for admission. Patient referred to primary care provider for BP management due to today's BP. Pt/family is agreeable and understands need for follow up and repeat testing.  Pt is aware that discharge does not mean that nothing is wrong but it indicates no emergency is present that requires admission and they must continue care with follow-up as given  below or physician of their choice.     FOLLOW-UP  Juan Jose Mcgee MD  [id number] [address]  [phone]    Schedule an appointment as soon as possible for a visit       Documentation assistance provided by nora Yuen for Dr. Bryn MD.  Information recorded by the scribe was done at my direction and has been verified and validated by me.                   Araceli Yuen  11/12/19 0059       Bulmaro Clemente MD  11/12/19 5361

## 2019-11-22 RX ORDER — IBUPROFEN AND FAMOTIDINE 800; 26.6 MG/1; MG/1
TABLET, COATED ORAL
Qty: 90 TABLET | Refills: 0 | Status: SHIPPED | OUTPATIENT
Start: 2019-11-22 | End: 2020-01-09

## 2019-12-05 ENCOUNTER — HOSPITAL ENCOUNTER (OUTPATIENT)
Dept: NEUROLOGY | Age: 29
Discharge: HOME | End: 2019-12-05
Payer: COMMERCIAL

## 2019-12-05 PROCEDURE — 95886 MUSC TEST DONE W/N TEST COMP: CPT

## 2019-12-05 PROCEDURE — 95911 NRV CNDJ TEST 9-10 STUDIES: CPT | Performed by: PSYCHIATRY & NEUROLOGY

## 2019-12-05 PROCEDURE — 95911 NRV CNDJ TEST 9-10 STUDIES: CPT

## 2019-12-05 PROCEDURE — 95886 MUSC TEST DONE W/N TEST COMP: CPT | Performed by: PSYCHIATRY & NEUROLOGY

## 2020-01-09 RX ORDER — IBUPROFEN AND FAMOTIDINE 800; 26.6 MG/1; MG/1
TABLET, COATED ORAL
Qty: 90 TABLET | Refills: 0 | Status: SHIPPED | OUTPATIENT
Start: 2020-01-09 | End: 2020-01-09 | Stop reason: SDUPTHER

## 2020-01-10 RX ORDER — IBUPROFEN AND FAMOTIDINE 800; 26.6 MG/1; MG/1
TABLET, COATED ORAL
Qty: 90 TABLET | Refills: 0 | Status: SHIPPED | OUTPATIENT
Start: 2020-01-10 | End: 2020-01-13

## 2020-01-13 RX ORDER — IBUPROFEN AND FAMOTIDINE 800; 26.6 MG/1; MG/1
TABLET, COATED ORAL
Qty: 90 TABLET | Refills: 0 | Status: SHIPPED | OUTPATIENT
Start: 2020-01-13 | End: 2020-02-06

## 2020-01-14 ENCOUNTER — APPOINTMENT (OUTPATIENT)
Dept: LAB | Facility: HOSPITAL | Age: 30
End: 2020-01-14

## 2020-01-14 ENCOUNTER — OFFICE VISIT (OUTPATIENT)
Dept: INTERNAL MEDICINE | Facility: CLINIC | Age: 30
End: 2020-01-14

## 2020-01-14 VITALS
DIASTOLIC BLOOD PRESSURE: 87 MMHG | HEIGHT: 62 IN | SYSTOLIC BLOOD PRESSURE: 120 MMHG | HEART RATE: 111 BPM | RESPIRATION RATE: 16 BRPM | WEIGHT: 95 LBS | BODY MASS INDEX: 17.48 KG/M2 | OXYGEN SATURATION: 98 %

## 2020-01-14 DIAGNOSIS — I73.00 RAYNAUD'S PHENOMENON WITHOUT GANGRENE: ICD-10-CM

## 2020-01-14 DIAGNOSIS — Z79.899 HIGH RISK MEDICATION USE: ICD-10-CM

## 2020-01-14 DIAGNOSIS — R13.10 DYSPHAGIA, UNSPECIFIED TYPE: Primary | ICD-10-CM

## 2020-01-14 DIAGNOSIS — M25.50 ARTHRALGIA, UNSPECIFIED JOINT: ICD-10-CM

## 2020-01-14 LAB
ASO AB SERPL-ACNC: NEGATIVE [IU]/ML
CHROMATIN AB SERPL-ACNC: <10 IU/ML (ref 0–14)

## 2020-01-14 PROCEDURE — 86431 RHEUMATOID FACTOR QUANT: CPT | Performed by: FAMILY MEDICINE

## 2020-01-14 PROCEDURE — 84436 ASSAY OF TOTAL THYROXINE: CPT | Performed by: FAMILY MEDICINE

## 2020-01-14 PROCEDURE — 86235 NUCLEAR ANTIGEN ANTIBODY: CPT | Performed by: FAMILY MEDICINE

## 2020-01-14 PROCEDURE — 84479 ASSAY OF THYROID (T3 OR T4): CPT | Performed by: FAMILY MEDICINE

## 2020-01-14 PROCEDURE — 86063 ANTISTREPTOLYSIN O SCREEN: CPT | Performed by: FAMILY MEDICINE

## 2020-01-14 PROCEDURE — 36415 COLL VENOUS BLD VENIPUNCTURE: CPT | Performed by: FAMILY MEDICINE

## 2020-01-14 PROCEDURE — 86038 ANTINUCLEAR ANTIBODIES: CPT | Performed by: FAMILY MEDICINE

## 2020-01-14 PROCEDURE — 83921 ORGANIC ACID SINGLE QUANT: CPT | Performed by: FAMILY MEDICINE

## 2020-01-14 PROCEDURE — 85025 COMPLETE CBC W/AUTO DIFF WBC: CPT | Performed by: FAMILY MEDICINE

## 2020-01-14 PROCEDURE — 99214 OFFICE O/P EST MOD 30 MIN: CPT | Performed by: FAMILY MEDICINE

## 2020-01-14 PROCEDURE — 80053 COMPREHEN METABOLIC PANEL: CPT | Performed by: FAMILY MEDICINE

## 2020-01-14 PROCEDURE — 82607 VITAMIN B-12: CPT | Performed by: FAMILY MEDICINE

## 2020-01-14 PROCEDURE — 84443 ASSAY THYROID STIM HORMONE: CPT | Performed by: FAMILY MEDICINE

## 2020-01-14 PROCEDURE — 84550 ASSAY OF BLOOD/URIC ACID: CPT | Performed by: FAMILY MEDICINE

## 2020-01-14 PROCEDURE — 82746 ASSAY OF FOLIC ACID SERUM: CPT | Performed by: FAMILY MEDICINE

## 2020-01-14 PROCEDURE — 86140 C-REACTIVE PROTEIN: CPT | Performed by: FAMILY MEDICINE

## 2020-01-14 PROCEDURE — 85652 RBC SED RATE AUTOMATED: CPT | Performed by: FAMILY MEDICINE

## 2020-01-14 PROCEDURE — 83520 IMMUNOASSAY QUANT NOS NONAB: CPT | Performed by: FAMILY MEDICINE

## 2020-01-14 NOTE — PROGRESS NOTES
Subjective   Dina Chacon is a 29 y.o. female.     No chief complaint on file.  Chief complaint is dysphasia.      Patient notes that since the beginning of April, patient has been experiencing signs and symptoms of raynauds phenomenon.  Patient has gone and seen a hand specialist Dr. Shook, who also believes it may be some underlying raynauds phenomenon going on.  Patient notes that she recently, over the last few months started having dysphasia.  Patient states that this would be more of a random event happening few times a month.  However over the last few weeks, patient notes that this is been happening more regularly, particularly whenever she eats.  Patient states that a lot of the food that she eats feels as if it is getting stuck, and notes that she has trouble swallowing it.  She has lost some weight since her last visit.  Patient actively works in the health field, and is concerned about scleroderma.  Also notes that she has aching of her joints, swelling of the hands.  Patient notes that both of her calves are also swollen as well.  Patient at times also experiencing some chest pressure, tightness, jaw pain and locking, hoarseness, horrible breath, skin feeling tight, and dizziness.  Patient is also shown pictures of her ray nods phenomenon which is affected both of her hands, as well as her feet.      History of Present Illness     The following portions of the patient's history were reviewed and updated as appropriate: allergies, current medications, past family history, past medical history, past social history, past surgical history and problem list.    Review of Systems   Constitutional: Positive for fatigue.   HENT: Positive for congestion and facial swelling.    Eyes: Positive for visual disturbance.   Respiratory: Positive for chest tightness.    Cardiovascular: Positive for leg swelling.   Musculoskeletal: Positive for arthralgias, joint swelling and myalgias.   Neurological: Positive  for dizziness.       Objective   Physical Exam   Constitutional: She is oriented to person, place, and time. She appears well-developed and well-nourished.   HENT:   Head: Normocephalic and atraumatic.   Right Ear: External ear normal.   Left Ear: External ear normal.   Eyes: EOM are normal.   Neck: Normal range of motion. Neck supple.   Cardiovascular: Normal rate, regular rhythm and normal heart sounds.   Pulmonary/Chest: Effort normal and breath sounds normal. No respiratory distress.   Musculoskeletal: Normal range of motion.   Lymphadenopathy:     She has no cervical adenopathy.   Neurological: She is alert and oriented to person, place, and time.   Skin: Skin is warm.   Swelling of leg and feet bilaterally.    Psychiatric: She has a normal mood and affect. Her behavior is normal.   Nursing note and vitals reviewed.      Vitals:    01/14/20 1146   BP: 120/87   Pulse: 111   Resp: 16   SpO2: 98%     Body mass index is 17.37 kg/m².      Assessment/Plan   Diagnoses and all orders for this visit:    Dysphagia, unspecified type  -     Ambulatory Referral to Gastroenterology    Raynaud's phenomenon without gangrene  -     Ambulatory Referral to Rheumatology    Arthralgia, unspecified joint    High risk medication use  -     Methylmalonic Acid, Serum  -     Vitamin B12  -     Thyroid Panel With TSH  -     CBC & Differential  -     Comprehensive Metabolic Panel  -     Folate  -     C-reactive Protein  -     Sedimentation Rate  -     Uric acid  -     Antistreptolysin O screen  -     Rheumatoid factor  -     SAMARA  -     Anti-Centromere B Antibodies  -     Anti-scleroderma Antibody  -     PM-SCL Antibodies  -     RNA Polymerase III IgG Antibodies      At today's office visit I discussed with patient that I would like to order several labs, particularly some rheumatological labs which may be specific for scleroderma.  Her signs and symptoms seem very generalized, however she does have many signs symptoms of an overall  connective tissue disease.  I discussed with her that I would like to refer her to gastroenterology for the dysphagia.  Patient may need an underlying EGD to help her with dilation.  Patient would also benefit from a rheumatology referral as well.  In the meantime we can do several labs to help us make a diagnosis.  In the meantime patient should do symptomatic treatment, as well.  For her raynauds, patient should continue with the cream that she was prescribed, as well as keeping her hands and feet warm.  For her dysphasia, patient would benefit from seeing GI.  She may also benefit from doing an underlying swallowing study.  For her arthralgias, patient continue take NSAIDs and Tylenol.      I have spent greater than 25 minutes with the patient today's office visit.  During this time more than 1/2 the time spent counseling patient about disease pathology as well as a pharmacology used to treat the disease.    No follow-ups on file.    Dictated utilizing Dragon Voice Recognition Software         Answers for HPI/ROS submitted by the patient on 1/14/2020   How long have you been having these symptoms?: past few days

## 2020-01-16 LAB
ALBUMIN SERPL-MCNC: 5.4 G/DL (ref 3.5–5.5)
ALBUMIN/GLOB SERPL: 2.5 {RATIO} (ref 1.2–2.2)
ALP SERPL-CCNC: 70 IU/L (ref 39–117)
ALT SERPL-CCNC: 16 IU/L (ref 0–32)
ANA SER QL: NEGATIVE
AST SERPL-CCNC: 20 IU/L (ref 0–40)
BASOPHILS # BLD AUTO: 0 X10E3/UL (ref 0–0.2)
BASOPHILS NFR BLD AUTO: 1 %
BILIRUB SERPL-MCNC: 0.3 MG/DL (ref 0–1.2)
BUN SERPL-MCNC: 8 MG/DL (ref 6–20)
BUN/CREAT SERPL: 11 (ref 9–23)
CALCIUM SERPL-MCNC: 10.1 MG/DL (ref 8.7–10.2)
CENTROMERE B AB SER-ACNC: <0.2 AI (ref 0–0.9)
CHLORIDE SERPL-SCNC: 101 MMOL/L (ref 96–106)
CO2 SERPL-SCNC: 21 MMOL/L (ref 20–29)
CREAT SERPL-MCNC: 0.71 MG/DL (ref 0.57–1)
CRP SERPL-MCNC: <1 MG/L (ref 0–10)
ENA SCL70 AB SER-ACNC: <0.2 AI (ref 0–0.9)
EOSINOPHIL # BLD AUTO: 0.1 X10E3/UL (ref 0–0.4)
EOSINOPHIL # BLD AUTO: 2 %
ERYTHROCYTE [DISTWIDTH] IN BLOOD BY AUTOMATED COUNT: 12.8 % (ref 11.7–15.4)
FOLATE SERPL-MCNC: 15 NG/ML
FT4I SERPL CALC-MCNC: 2.6 (ref 1.2–4.9)
GLOBULIN SER CALC-MCNC: 2.2 G/DL (ref 1.5–4.5)
GLUCOSE SERPL-MCNC: 101 MG/DL (ref 65–99)
HCT VFR BLD AUTO: 45 % (ref 34–46.6)
HGB BLD-MCNC: 15.5 G/DL (ref 11.1–15.9)
IMM GRANULOCYTES # BLD: 0 X10E3/UL (ref 0–0.1)
IMM GRANULOCYTES NFR BLD: 0 %
LYMPHOCYTES # BLD AUTO: 2.3 X10E3/UL (ref 0.7–3.1)
LYMPHOCYTES NFR BLD AUTO: 38 %
MCH RBC QN AUTO: 34.9 PG (ref 26.6–33)
MCHC RBC AUTO-ENTMCNC: 34.4 G/DL (ref 31.5–35.7)
MCV RBC AUTO: 101 FL (ref 79–97)
MONOCYTES # BLD AUTO: 0.4 X10E3/UL (ref 0.1–0.9)
MONOCYTES NFR BLD AUTO: 6 %
NEUTROPHILS # BLD AUTO: 3.2 X10E3/UL (ref 1.4–7)
NEUTROPHILS NFR BLD AUTO: 53 %
PLATELET # BLD AUTO: 346 X10E3/UL (ref 150–450)
POTASSIUM SERPL-SCNC: 4.3 MMOL/L (ref 3.5–5.2)
PROT SERPL-MCNC: 7.6 G/DL (ref 6–8.5)
RBC # BLD AUTO: 4.44 X10E6/UL (ref 3.77–5.28)
SODIUM SERPL-SCNC: 141 MMOL/L (ref 134–144)
T3RU NFR SERPL: 28 % (ref 24–39)
T4 SERPL-MCNC: 9.2 UG/DL (ref 4.5–12)
TSH SERPL-ACNC: 1.9 UIU/ML (ref 0.45–4.5)
URATE SERPL-MCNC: 3.7 MG/DL (ref 2.5–7.1)
VIT B12 SERPL-MCNC: 235 PG/ML (ref 232–1245)
WBC # BLD AUTO: 6 X10E3/UL (ref 3.4–10.8)

## 2020-01-17 LAB — ERYTHROCYTE [SEDIMENTATION RATE] IN BLOOD BY WESTERGREN METHOD: NORMAL MM/H

## 2020-01-18 LAB
Lab: ABNORMAL
METHYLMALONATE SERPL-SCNC: 489 NMOL/L (ref 0–378)

## 2020-01-20 NOTE — PROGRESS NOTES
Please inform the patient of the following abnormal results.  All labs particularly rheumatological labs which have resulted have all been within normal limits.  Patient still continues to have some B12 deficiency.  Her methylmalonic acid is elevated which would indicate this B12 deficiency as well as her MCV being over 100.  Continue taking monthly B12 injections.

## 2020-01-21 LAB — Lab: 2.8 UNITS/ML

## 2020-01-22 LAB — ENA: PM-SCL ANTIBODY*: 7 EU/ML

## 2020-02-03 ENCOUNTER — APPOINTMENT (OUTPATIENT)
Dept: GENERAL RADIOLOGY | Facility: HOSPITAL | Age: 30
End: 2020-02-03

## 2020-02-06 RX ORDER — IBUPROFEN AND FAMOTIDINE 800; 26.6 MG/1; MG/1
TABLET, COATED ORAL
Qty: 90 TABLET | Refills: 0 | Status: SHIPPED | OUTPATIENT
Start: 2020-02-06 | End: 2020-02-07

## 2020-02-07 RX ORDER — IBUPROFEN AND FAMOTIDINE 800; 26.6 MG/1; MG/1
TABLET, COATED ORAL
Qty: 90 TABLET | Refills: 0 | Status: SHIPPED | OUTPATIENT
Start: 2020-02-07 | End: 2020-02-07

## 2020-02-07 RX ORDER — IBUPROFEN AND FAMOTIDINE 800; 26.6 MG/1; MG/1
TABLET, COATED ORAL
Qty: 90 TABLET | Refills: 0 | Status: SHIPPED | OUTPATIENT
Start: 2020-02-07 | End: 2020-02-10

## 2020-02-10 ENCOUNTER — HOSPITAL ENCOUNTER (OUTPATIENT)
Dept: GENERAL RADIOLOGY | Facility: HOSPITAL | Age: 30
Discharge: HOME | End: 2020-02-10
Admitting: FAMILY MEDICINE

## 2020-02-10 PROCEDURE — 74230 X-RAY XM SWLNG FUNCJ C+: CPT

## 2020-02-10 PROCEDURE — 92611 MOTION FLUOROSCOPY/SWALLOW: CPT

## 2020-02-10 RX ORDER — IBUPROFEN AND FAMOTIDINE 800; 26.6 MG/1; MG/1
TABLET, COATED ORAL
Qty: 90 TABLET | Refills: 0 | Status: SHIPPED | OUTPATIENT
Start: 2020-02-10 | End: 2020-02-11

## 2020-02-10 RX ADMIN — BARIUM SULFATE 55 ML: 0.81 POWDER, FOR SUSPENSION ORAL at 13:52

## 2020-02-10 RX ADMIN — BARIUM SULFATE 4 ML: 980 POWDER, FOR SUSPENSION ORAL at 13:52

## 2020-02-10 NOTE — MBS/VFSS/FEES
Outpatient Speech Language Pathology   Adult Swallow Initial Evaluation  [facility]     Patient Name: Dina Chacon  : 1990  MRN: [id number]  Today's Date: 2/10/2020         Visit Date: 02/10/2020   Patient Active Problem List   Diagnosis   • Bursitis of left foot   • Pain of toe of left foot   • Adnexal mass   • Surgery follow-up examination   • Status post laminectomy   • ADHD (attention deficit hyperactivity disorder)   • Vitamin B12 deficiency        Past Medical History:   Diagnosis Date   • ADHD (attention deficit hyperactivity disorder)    • Allergic    • Head ache    • Injury of back    • Low back pain    • Numbness and tingling     right leg and foot   • PONV (postoperative nausea and vomiting)    • Right leg pain         Past Surgical History:   Procedure Laterality Date   • BREAST SURGERY      augmentation   • BREAST SURGERY  2016    REPAIR (LEAK)   •  SECTION     •  SECTION     •  SECTION     • COLPOSCOPY     • LEEP     • LUMBAR DISCECTOMY Right 2019    Procedure: Right L5-S1 laminectomy and discectomy with metrx;  Surgeon: En Edouard MD;  Location: [facility];  Service: Neurosurgery   • NOSE SURGERY      NASAL RECONSTRUCTION   • RHINOPLASTY  2017    RHINO/SEPTOPLASTY         Visit Dx:   No diagnosis found.        SLP Adult Swallow Evaluation     Row Name 02/10/20 1600       Rehab Evaluation    Document Type  evaluation  -AW    Subjective Information  no complaints  -AW    Patient Observations  alert;cooperative;agree to therapy  -AW    Patient Effort  good  -AW    Symptoms Noted During/After Treatment  none  -AW       General Information    Patient Profile Reviewed  yes  -AW    Pertinent History Of Current Problem  Pt c/o difficulty swallowing over the last 3-4 weeks stating it feels like food gets stuck in her throat. She stated she sometimes has gagging and acid reflux, for which she has been taking chewable TUMS. Pt  reported hoarseness at times and a tightness in her throat. Pt has had some recent weight loss. Pt is being worked up for a variety of symptoms.  -AW    Current Method of Nutrition  regular textures;thin liquids  -AW    Precautions/Limitations, Vision  WFL;for purposes of eval  -AW    Precautions/Limitations, Hearing  WFL  -AW    Prior Level of Function-Communication  WFL  -AW    Prior Level of Function-Swallowing  no diet consistency restrictions  -AW    Plans/Goals Discussed with  patient;agreed upon  -AW    Barriers to Rehab  none identified  -AW    Patient's Goals for Discharge  return to all previous roles/activities  -AW       Pain Assessment    Additional Documentation  Pain Scale: Numbers Pre/Post-Treatment (Group)  -AW       Pain Scale: Numbers Pre/Post-Treatment    Pain Scale: Numbers, Pretreatment  0/10 - no pain  -AW    Pain Scale: Numbers, Post-Treatment  0/10 - no pain  -AW       Oral Motor and Function    Dentition Assessment  natural, present and adequate  -AW    Secretion Management  WNL/WFL  -AW    Mucosal Quality  moist, healthy  -AW       Oral Musculature and Cranial Nerve Assessment    Oral Motor General Assessment  WFL  -AW       General Eating/Swallowing Observations    Respiratory Support Currently in Use  room air  -AW    Eating/Swallowing Skills  self-fed  -AW    Positioning During Eating  upright in chair  -AW       MBS/VFSS    Utensils Used  spoon;cup;straw  -AW    Consistencies Trialed  regular textures;soft textures;pureed;thin liquids mixed  -AW       MBS/VFSS Interpretation    Oral Prep Phase  WFL  -AW    Oral Transit Phase  WFL  -AW    Oral Residue  WFL  -AW    VFSS Summary  Pt exhibited a functional oropharyngeal swallow with no penetration or aspiration seen with thin liquids (cup/straw), pureed, soft solids, mixed consistency, and regular solids. Pt c/o a globus sensation during study with no significant pharyngeal residuals. An esophageal scan showed slow esophageal clearance  with some retrograde movement and an irregular barium (for solids). Liquids appeared to pass through the esophagus without difficulty. Additional swallows or liquid wash assisted in clearing pt's esophagus.   -AW       Initiation of Pharyngeal Swallow    Pharyngeal Phase  functional pharyngeal phase of swallowing  -AW       Esophageal Phase    Esophageal Phase  esophageal retention;esophageal retention with retrograde flow below PES;irregular barium column  -AW       SLP Communication to Radiology    Summary Statement  Pt exhibited a functional oropharyngeal swallow with no penetration or aspiration seen with thin liquids (cup/straw), pureed, soft solids, mixed consistency, and regular solids. Pt c/o a globus sensation during study with no significant pharyngeal residuals. An esophageal scan showed slow esophageal clearance with some retrograde movement and an irregular barium (for solids). Liquids appeared to pass through the esophagus without difficulty. Additional swallows or liquid wash assisted in clearing pt's esophagus.   -AW       Clinical Impression    SLP Swallowing Diagnosis  functional oral phase;functional pharyngeal phase;esophageal dysfunction  -AW    Functional Impact  risk of aspiration/pneumonia  -AW    Swallow Criteria for Skilled Therapeutic Interventions Met  no problems identified which require skilled intervention  -AW       Recommendations    SLP Diet Recommendation  regular textures;thin liquids  -AW    Recommended Precautions and Strategies  upright posture during/after eating;small bites of food and sips of liquid;multiple swallows per bite of food;alternate between small bites of food and sips of liquid  -AW    SLP Rec. for Method of Medication Administration  meds whole;with thin liquids  -AW    Monitor for Signs of Aspiration  yes  -AW    Demonstrates Need for Referral to Another Service  gastroenterology;dedicated esophageal assessment  -AW      User Key  (r) = Recorded By, (t) = Taken  By, (c) = Cosigned By    Initials Name Provider Type    Deya Camp MS CCC-SLP Speech and Language Pathologist                        OP SLP Education     Row Name 02/10/20 1707       Education    Barriers to Learning  No barriers identified  -AW    Assessed  Learning needs;Learning motivation;Learning preferences;Learning readiness  -AW    Learning Motivation  Strong  -AW    Learning Method  Explanation;Demonstration;Teach back  -AW    Teaching Response  Verbalized understanding;Demonstrated understanding  -AW      User Key  (r) = Recorded By, (t) = Taken By, (c) = Cosigned By    Initials Name Effective Dates    Deya Camp MS CCC-SLP 06/08/18 -               OP SLP Assessment/Plan - 02/10/20 1707        SLP Assessment    Functional Problems  Swallowing   -AW    Impact on Function: Swallowing  Risk of aspiration   -AW    Clinical Impression: Swallowing  esophageal dysphagia   -AW    Prognosis  Good (comment)   -AW    Patient/caregiver participated in establishment of treatment plan and goals  Yes   -AW    Patient would benefit from skilled therapy intervention  No   -AW      User Key  (r) = Recorded By, (t) = Taken By, (c) = Cosigned By    Initials Name Provider Type    Deya Camp MS CCC-SLP Speech and Language Pathologist              SLP Outcome Measures (last 72 hours)      SLP Outcome Measures     Row Name 02/10/20 1700             SLP Outcome Measures    Outcome Measure Used?  Adult NOMS  -AW         Adult FCM Scores    FCM Chosen  Swallowing  -AW      Swallowing FCM Score  7  -AW        User Key  (r) = Recorded By, (t) = Taken By, (c) = Cosigned By    Initials Name Effective Dates    Deya Camp MS CCC-SLP 06/08/18 -                Time Calculation:   SLP Start Time: 1300  SLP Stop Time: 1400  SLP Time Calculation (min): 60 min    Therapy Charges for Today     Code Description Service Date Service Provider Modifiers Qty    60146321186 HC ST MOTION FLUORO EVAL SWALLOW 4 2/10/2020 Deya Porras  MS CCC-SLP GN 1                   Deya Porras, MS CCC-SLP  2/10/2020

## 2020-02-11 RX ORDER — IBUPROFEN AND FAMOTIDINE 800; 26.6 MG/1; MG/1
TABLET, COATED ORAL
Qty: 90 TABLET | Refills: 0 | Status: SHIPPED | OUTPATIENT
Start: 2020-02-11 | End: 2020-02-20

## 2020-02-14 ENCOUNTER — TELEPHONE (OUTPATIENT)
Dept: INTERNAL MEDICINE | Facility: CLINIC | Age: 30
End: 2020-02-14

## 2020-02-14 NOTE — TELEPHONE ENCOUNTER
Patient called asking for advise.  She has an appointment to see GI in April but she noticed blood in her stool and has had cramping.  She wanted to know if this could wait until she is seen by GI in April or if she should be seen seen for this sooner.  Please advise.

## 2020-02-20 RX ORDER — IBUPROFEN AND FAMOTIDINE 800; 26.6 MG/1; MG/1
TABLET, COATED ORAL
Qty: 90 TABLET | Refills: 0 | Status: SHIPPED | OUTPATIENT
Start: 2020-02-20 | End: 2020-03-19

## 2020-02-25 ENCOUNTER — HOSPITAL ENCOUNTER (OUTPATIENT)
Dept: GENERAL RADIOLOGY | Facility: HOSPITAL | Age: 30
Discharge: HOME | End: 2020-02-25

## 2020-02-25 ENCOUNTER — TRANSCRIBE ORDERS (OUTPATIENT)
Dept: ADMINISTRATIVE | Facility: HOSPITAL | Age: 30
End: 2020-02-25

## 2020-02-25 ENCOUNTER — HOSPITAL ENCOUNTER (OUTPATIENT)
Dept: GENERAL RADIOLOGY | Facility: HOSPITAL | Age: 30
Discharge: HOME | End: 2020-02-25
Admitting: INTERNAL MEDICINE

## 2020-02-25 DIAGNOSIS — M25.541 PAIN IN JOINT OF RIGHT HAND: ICD-10-CM

## 2020-02-25 DIAGNOSIS — M54.50 LOW BACK PAIN, UNSPECIFIED BACK PAIN LATERALITY, UNSPECIFIED CHRONICITY, UNSPECIFIED WHETHER SCIATICA PRESENT: ICD-10-CM

## 2020-02-25 DIAGNOSIS — M25.541 PAIN IN JOINT OF RIGHT HAND: Primary | ICD-10-CM

## 2020-02-25 DIAGNOSIS — M79.671 RIGHT FOOT PAIN: ICD-10-CM

## 2020-02-25 DIAGNOSIS — M25.542 PAIN, JOINT, HAND, LEFT: ICD-10-CM

## 2020-02-25 DIAGNOSIS — M79.672 LEFT FOOT PAIN: ICD-10-CM

## 2020-02-25 PROCEDURE — 73130 X-RAY EXAM OF HAND: CPT

## 2020-02-25 PROCEDURE — 73630 X-RAY EXAM OF FOOT: CPT

## 2020-02-25 PROCEDURE — 72200 X-RAY EXAM SI JOINTS: CPT

## 2020-03-11 ENCOUNTER — OFFICE VISIT (OUTPATIENT)
Dept: OBSTETRICS AND GYNECOLOGY | Age: 30
End: 2020-03-11

## 2020-03-11 VITALS
SYSTOLIC BLOOD PRESSURE: 100 MMHG | HEIGHT: 62 IN | WEIGHT: 99 LBS | DIASTOLIC BLOOD PRESSURE: 60 MMHG | BODY MASS INDEX: 18.22 KG/M2

## 2020-03-11 DIAGNOSIS — T19.2XXA RETAINED TAMPON, INITIAL ENCOUNTER: Primary | ICD-10-CM

## 2020-03-11 PROCEDURE — 99213 OFFICE O/P EST LOW 20 MIN: CPT | Performed by: PHYSICIAN ASSISTANT

## 2020-03-11 RX ORDER — METRONIDAZOLE 500 MG/1
500 TABLET ORAL 2 TIMES DAILY
Qty: 14 TABLET | Refills: 0 | Status: SHIPPED | OUTPATIENT
Start: 2020-03-11 | End: 2020-03-18

## 2020-03-11 NOTE — PROGRESS NOTES
"Subjective     Chief Complaint   Patient presents with   • Gynecologic Exam     retained tampon       Dina Chacon is a 29 y.o.  whose LMP is Patient's last menstrual period was 03/10/2020. presents with retained tampon  She placed it early this morning  When she went to pull it out, the string broke  Tried to get it out on her own but unable to reach it    She is a pt of Dr urias  New to me with this concern  Overdue for her annual      No Additional Complaints Reported    The following portions of the patient's history were reviewed and updated as appropriate:vital signs, allergies, current medications, past family history, past medical history, past social history, past surgical history and problem list      Review of Systems   Genitourinary:positive for retained tampons     Objective      /60   Ht 157.5 cm (62\")   Wt 44.9 kg (99 lb)   LMP 03/10/2020   Breastfeeding No   BMI 18.11 kg/m²      Physical Exam    General:   alert, comfortable and no distress   Heart: Not performed today   Lungs: Not performed today.   Breast: Not performed today   Neck: na   Abdomen: { Not performed today   CVA: Not performed today   Pelvis: External genitalia: normal general appearance  Vaginal: foreign object, tampon found and removed. No unusual d/c noted, scant bleeding   Extremities: Not performed today   Neurologic: negative   Psychiatric: Normal affect, judgement, and mood       Lab Review   Labs: No data reviewed     Imaging   No data reviewed    Assessment/Plan     ASSESSMENT  1. Retained tampon, initial encounter        PLAN  1. Tampon removed without incident.  Flagyl sent in for pt as she is prone to vaginal infections.  Will schedule annual exam soon    2. Medications prescribed this encounter:        New Medications Ordered This Visit   Medications   • metroNIDAZOLE (FLAGYL) 500 MG tablet     Sig: Take 1 tablet by mouth 2 (Two) Times a Day for 7 days.     Dispense:  14 tablet     Refill:  0 "           Follow up: 4 week(s) for annual     CHIKI Coyle  3/11/2020

## 2020-03-19 RX ORDER — IBUPROFEN AND FAMOTIDINE 800; 26.6 MG/1; MG/1
TABLET, COATED ORAL
Qty: 90 TABLET | Refills: 0 | Status: SHIPPED | OUTPATIENT
Start: 2020-03-19 | End: 2020-03-24

## 2020-03-24 RX ORDER — IBUPROFEN AND FAMOTIDINE 800; 26.6 MG/1; MG/1
TABLET, COATED ORAL
Qty: 90 TABLET | Refills: 0 | Status: SHIPPED | OUTPATIENT
Start: 2020-03-24 | End: 2020-03-25 | Stop reason: SDUPTHER

## 2020-03-25 RX ORDER — IBUPROFEN AND FAMOTIDINE 26.6; 8 MG/1; MG/1
1 TABLET, FILM COATED ORAL 3 TIMES DAILY
Qty: 90 TABLET | Refills: 2 | Status: SHIPPED | OUTPATIENT
Start: 2020-03-25 | End: 2020-04-08 | Stop reason: ALTCHOICE

## 2020-04-08 ENCOUNTER — TELEMEDICINE (OUTPATIENT)
Dept: GASTROENTEROLOGY | Facility: CLINIC | Age: 30
End: 2020-04-08

## 2020-04-08 ENCOUNTER — TELEPHONE (OUTPATIENT)
Dept: GASTROENTEROLOGY | Facility: CLINIC | Age: 30
End: 2020-04-08

## 2020-04-08 VITALS — BODY MASS INDEX: 18.03 KG/M2 | HEIGHT: 62 IN | WEIGHT: 98 LBS

## 2020-04-08 DIAGNOSIS — R93.3 ABNORMAL BARIUM SWALLOW: ICD-10-CM

## 2020-04-08 DIAGNOSIS — R13.10 DYSPHAGIA, UNSPECIFIED TYPE: Primary | ICD-10-CM

## 2020-04-08 DIAGNOSIS — R10.84 GENERALIZED ABDOMINAL PAIN: ICD-10-CM

## 2020-04-08 PROCEDURE — 99203 OFFICE O/P NEW LOW 30 MIN: CPT | Performed by: INTERNAL MEDICINE

## 2020-04-08 RX ORDER — OMEPRAZOLE 40 MG/1
40 CAPSULE, DELAYED RELEASE ORAL DAILY
Qty: 30 CAPSULE | Refills: 11 | Status: SHIPPED | OUTPATIENT
Start: 2020-04-08 | End: 2021-03-31

## 2020-04-08 NOTE — PROGRESS NOTES
Chief Complaint   Patient presents with   • Difficulty Swallowing   • Black or Bloody Stool   • Abdominal Pain       History of Present Illness:   29 y.o. female c/o food coming up when I would eat. Her esophagus may contract when swallowing. She may have a smell of poop, chemically ammonia, sweet smell thru her mouth. She saw an ENT who did a CT of her sinuses and said she was OK.   She has had dark stool. Some heartburn. She has tried PPI's in the past and didn't notice any help.   She has had periumbilical pain and to the right. She also upper abdominal pain with radiation to the back. This is worse - she doesn't know. She doesn't know what makes the abdominal pain better. Some nausea but no vomiting especially after eating.        Some diarrhea (not a ton). Stool is softer. She is more constipated. No rectal bleeding. No weight loss. She used to take a combination of ibuprofen and a PPI. Hasn't taken in one month.        She has seen Dr. Kalina Pino. She has swelling in her hands and feet. She had celiac sprue testing that was negative. She is vegetarian and is getting vitamin B12 injections monthly. She works for [employer]. Smokes 1/2 PPD. ETOH - rare. : 3 kids: 12 yo, 7 yo, 3 yo. She had an EGD and colonoscopy by Dr. Viridiana Mo many years ago. (int hemorrhoids, and anal fissures, one colon polyp removed). LMP now. . There is no association of her abdominal pain with menstrual cycle.     We spent 22 minutes talking thru the Video Chat set up thru [email or web address] (I talked to her thru the phone because we could not get audio to work thru [email or web address]).     Past Medical History:   Diagnosis Date   • ADHD (attention deficit hyperactivity disorder)    • Allergic    • GERD (gastroesophageal reflux disease) 2020   • Head ache    • Injury of back    • Low back pain    • Numbness and tingling     right leg and foot   • PONV (postoperative nausea and vomiting)    • Right leg pain        Past Surgical  History:   Procedure Laterality Date   • BREAST SURGERY      augmentation   • BREAST SURGERY  2016    REPAIR (LEAK)   •  SECTION     •  SECTION     •  SECTION     • COLONOSCOPY     • COLPOSCOPY  2014   • LEEP  2014   • LUMBAR DISCECTOMY Right 2019    Procedure: Right L5-S1 laminectomy and discectomy with metrx;  Surgeon: En Edouard MD;  Location: [facility];  Service: Neurosurgery   • NOSE SURGERY      NASAL RECONSTRUCTION   • RHINOPLASTY      RHINO/SEPTOPLASTY         Current Outpatient Medications:   •  ACZONE 7.5 % gel, , Disp: , Rfl:   •  ADDERALL XR 20 MG 24 hr capsule, Take 20 mg by mouth Daily  , Disp: , Rfl:   •  cyanocobalamin 1000 MCG/ML injection, Inject 1 mL into the appropriate muscle as directed by prescriber Every 28 (Twenty-Eight) Days., Disp: 3 mL, Rfl: 6  •  methocarbamol (ROBAXIN) 500 MG tablet, Take 1 tablet by mouth 4 (Four) Times a Day As Needed for Muscle Spasms., Disp: 120 tablet, Rfl: 0  •  mupirocin (BACTROBAN) 2 % ointment, , Disp: , Rfl:   •  spironolactone (ALDACTONE) 50 MG tablet, , Disp: , Rfl:   •  omeprazole (priLOSEC) 40 MG capsule, Take 1 capsule by mouth Daily., Disp: 30 capsule, Rfl: 11    Current Facility-Administered Medications:   •  cyanocobalamin injection 1,000 mcg, 1,000 mcg, Intramuscular, Q28 Days, Juan Jose Mcgee MD, 1,000 mcg at 19 1151    Allergies   Allergen Reactions   • Gelatin Hives   • Sulfa Antibiotics Hives       Family History   Problem Relation Age of Onset   • Multiple sclerosis Other    • Malig Hyperthermia Neg Hx        Social History     Socioeconomic History   • Marital status:      Spouse name: Not on file   • Number of children: Not on file   • Years of education: Not on file   • Highest education level: Not on file   Tobacco Use   • Smoking status: Former Smoker     Packs/day: 0.50     Years: 0.00     Pack years: 0.00     Types: Cigarettes   • Smokeless tobacco: Never  "Used   Substance and Sexual Activity   • Alcohol use: Yes     Comment: social drinker   • Drug use: No   • Sexual activity: Yes     Partners: Male     Birth control/protection: None       Review of Systems   Gastrointestinal: Positive for abdominal pain.     Pertinent positives and negatives documented in the HPI and all other systems reviewed and were found to be negative.  There were no vitals filed for this visit.    Physical Exam   Constitutional: She is oriented to person, place, and time. She appears well-developed and well-nourished.   HENT:   Head: Normocephalic and atraumatic.   Right Ear: External ear normal.   Left Ear: External ear normal.   Nose: Nose normal.   Eyes: Conjunctivae are normal.   Neck: Normal range of motion. No tracheal deviation present. No thyromegaly present.   Neurological: She is alert and oriented to person, place, and time.   Skin: Skin is dry.   Psychiatric: She has a normal mood and affect. Her behavior is normal. Judgment and thought content normal.       Dina was seen today for difficulty swallowing, black or bloody stool and abdominal pain.    Diagnoses and all orders for this visit:    Dysphagia, unspecified type  -     FL Esophagram Complete; Future  -     omeprazole (priLOSEC) 40 MG capsule; Take 1 capsule by mouth Daily.    Generalized abdominal pain  -     CT Abdomen Pelvis With Contrast; Future  -     omeprazole (priLOSEC) 40 MG capsule; Take 1 capsule by mouth Daily.    Abnormal barium swallow      Assessment:  1.  Abnormal video swallow in 2 of 2020 showing \"slow esophageal clearance with some retrograde movement\".  2.Dysphagia.  3) abdominal pain.  4.     Recommendations:  1. Get records from Dr. Viridiana Mo at [facility].   2. Barium swallow  3. Omeprazole 40 mg/day.  4. CT abd/pelvis  5. F/u 6 weeks.     No follow-ups on file.    Carlos Echevarria MD  4/8/2020  "

## 2020-04-08 NOTE — TELEPHONE ENCOUNTER
----- Message from Carlos Echevarria MD sent at 2020  2:06 PM EDT -----  Please get her records from the office of Dr. Viridiana Mo at [facility] (she is now  but used to work with Dr. HEMANT Mckee when he worked for [facility] years ago). I am specifically interested in her most recent office note, her EGD and colonoscopy reports and path reports. [email or web address]

## 2020-04-08 NOTE — TELEPHONE ENCOUNTER
Called Dr Angulo' office at [phone] and spoke with Alicia who advised we need to call [phone] [facility] for records.    Called [facility] at [phone] and was able to request egd . C/s and path report be faxed to us at [phone]..     Was advised to call [phone] for office notes.   Called this number and continues to ring.     Call [facility] back and was given another number [phone] and vm requested pt's recent office note from Dr Angulo.

## 2020-04-13 NOTE — TELEPHONE ENCOUNTER
Again called number listed and left vm requesting pt's last office note from Dr Mo.     Message sent to Dr Echevarria regarding scope notes under media tab.

## 2020-04-17 ENCOUNTER — HOSPITAL ENCOUNTER (OUTPATIENT)
Dept: GENERAL RADIOLOGY | Facility: HOSPITAL | Age: 30
Discharge: HOME | End: 2020-04-17
Admitting: INTERNAL MEDICINE

## 2020-04-17 ENCOUNTER — HOSPITAL ENCOUNTER (OUTPATIENT)
Dept: CT IMAGING | Facility: HOSPITAL | Age: 30
Discharge: HOME | End: 2020-04-17

## 2020-04-17 DIAGNOSIS — R10.84 GENERALIZED ABDOMINAL PAIN: ICD-10-CM

## 2020-04-17 DIAGNOSIS — R13.10 DYSPHAGIA, UNSPECIFIED TYPE: ICD-10-CM

## 2020-04-17 PROCEDURE — 0 DIATRIZOATE MEGLUMINE & SODIUM PER 1 ML: Performed by: INTERNAL MEDICINE

## 2020-04-17 PROCEDURE — 74221 X-RAY XM ESOPHAGUS 2CNTRST: CPT

## 2020-04-17 PROCEDURE — 25010000002 IOPAMIDOL 61 % SOLUTION: Performed by: INTERNAL MEDICINE

## 2020-04-17 PROCEDURE — 74177 CT ABD & PELVIS W/CONTRAST: CPT

## 2020-04-17 RX ADMIN — DIATRIZOATE MEGLUMINE AND DIATRIZOATE SODIUM 30 ML: 600; 100 SOLUTION ORAL; RECTAL at 08:30

## 2020-04-17 RX ADMIN — IOPAMIDOL 85 ML: 612 INJECTION, SOLUTION INTRAVENOUS at 09:24

## 2020-04-20 ENCOUNTER — TELEPHONE (OUTPATIENT)
Dept: GASTROENTEROLOGY | Facility: CLINIC | Age: 30
End: 2020-04-20

## 2020-04-20 NOTE — PROGRESS NOTES
04/20/20  I did call her to go over the results of this CAT scan of the abdomen and pelvis and her barium swallow but I could not get through to her.  Tell her that her barium swallow came back normal.  Also this CAT scan of the abdomen and pelvis shows bilateral ovarian cysts.  The rest of the CAT scan of the abdomen and pelvis looks okay.  I would have her follow-up with her OB/GYN to make sure that the OB/GYN does not have any issues with these ovarian cysts.       In reviewing her tele-phone visit from 4/8/2020 I see that she was supposed to follow-up with me in 6 weeks.  Please make sure that that happens.  Also fax a copy of this report to her PCP.  Ari. [email or web address]

## 2020-04-20 NOTE — TELEPHONE ENCOUNTER
----- Message from Carlos Echevarria MD sent at 4/20/2020  9:11 AM EDT -----  04/20/20  I did call her to go over the results of this CAT scan of the abdomen and pelvis and her barium swallow but I could not get through to her.  Tell her that her barium swallow came back normal.  Also this CAT scan of the abdomen and pelvis shows bilateral ovarian cysts.  The rest of the CAT scan of the abdomen and pelvis looks okay.  I would have her follow-up with her OB/GYN to make sure that the OB/GYN does not have any issues with these ovarian cysts.       In reviewing her tele-phone visit from 4/8/2020 I see that she was supposed to follow-up with me in 6 weeks.  Please make sure that that happens.  Also fax a copy of this report to her PCP.  Chaim tuttle

## 2020-04-20 NOTE — TELEPHONE ENCOUNTER
Called pt and advised of Dr Echevarria's note.  Pt verb understanding and made appt for 05/18 at 815a with Dr Echevarria.     Copy of Dr Echevarria's note sent to Dr Mcgee thru [facility].

## 2020-09-21 ENCOUNTER — LAB (OUTPATIENT)
Dept: LAB | Facility: HOSPITAL | Age: 30
End: 2020-09-21

## 2020-09-21 PROCEDURE — 86140 C-REACTIVE PROTEIN: CPT | Performed by: FAMILY MEDICINE

## 2020-09-21 PROCEDURE — 85652 RBC SED RATE AUTOMATED: CPT | Performed by: FAMILY MEDICINE

## 2020-09-21 PROCEDURE — 85007 BL SMEAR W/DIFF WBC COUNT: CPT | Performed by: FAMILY MEDICINE

## 2020-09-21 PROCEDURE — 85025 COMPLETE CBC W/AUTO DIFF WBC: CPT | Performed by: FAMILY MEDICINE

## 2020-09-21 PROCEDURE — 80053 COMPREHEN METABOLIC PANEL: CPT | Performed by: FAMILY MEDICINE

## 2020-09-23 DIAGNOSIS — D75.89 MACROCYTOSIS: Primary | ICD-10-CM

## 2020-10-26 ENCOUNTER — HOSPITAL ENCOUNTER (OUTPATIENT)
Dept: ULTRASOUND IMAGING | Facility: HOSPITAL | Age: 30
Discharge: HOME | End: 2020-10-26

## 2020-10-26 DIAGNOSIS — R59.1 LYMPHADENOPATHY: ICD-10-CM

## 2020-10-26 PROCEDURE — 76882 US LMTD JT/FCL EVL NVASC XTR: CPT

## 2020-10-26 PROCEDURE — 76536 US EXAM OF HEAD AND NECK: CPT

## 2020-10-26 PROCEDURE — 76857 US EXAM PELVIC LIMITED: CPT

## 2020-10-27 DIAGNOSIS — R59.1 LYMPHADENOPATHY: Primary | ICD-10-CM

## 2020-10-27 NOTE — PROGRESS NOTES
Please inform the patient of the following abnormal results.    Several small to slightly enlarged lymph nodes in the neck, bilateral  axilla and groin.  Significance of these is uncertain, however, given the multiplicity  of these nodules and borderline enlargement further evaluation is  Recommended. Recommended by radiology to Consider followup with CT scan of the neck, chest, abdomen and pelvis  with IV and oral contrast to assess for any other lymphadenopathy or  masses. This can also be used as a baseline for future followup if  Indicated.    I have ordered a CT scan with contrast for the neck, chest, and abdomen pelvis.

## 2020-10-30 ENCOUNTER — OFFICE VISIT (OUTPATIENT)
Dept: INTERNAL MEDICINE | Facility: CLINIC | Age: 30
End: 2020-10-30

## 2020-10-30 VITALS
BODY MASS INDEX: 18.03 KG/M2 | SYSTOLIC BLOOD PRESSURE: 100 MMHG | OXYGEN SATURATION: 99 % | WEIGHT: 98 LBS | RESPIRATION RATE: 16 BRPM | HEIGHT: 62 IN | DIASTOLIC BLOOD PRESSURE: 60 MMHG | HEART RATE: 119 BPM | TEMPERATURE: 97.1 F

## 2020-10-30 DIAGNOSIS — M51.36 BULGING LUMBAR DISC: ICD-10-CM

## 2020-10-30 DIAGNOSIS — R59.1 LYMPHADENOPATHY: Primary | ICD-10-CM

## 2020-10-30 DIAGNOSIS — D75.89 MACROCYTOSIS WITHOUT ANEMIA: ICD-10-CM

## 2020-10-30 PROCEDURE — 99214 OFFICE O/P EST MOD 30 MIN: CPT | Performed by: FAMILY MEDICINE

## 2020-10-30 RX ORDER — METHOCARBAMOL 500 MG/1
500 TABLET, FILM COATED ORAL 4 TIMES DAILY PRN
Qty: 120 TABLET | Refills: 0 | Status: SHIPPED | OUTPATIENT
Start: 2020-10-30 | End: 2021-04-14 | Stop reason: SDUPTHER

## 2020-10-30 NOTE — PROGRESS NOTES
Subjective   Dina Chacon is a 30 y.o. female.     Chief Complaint   Patient presents with   • Follow-up         History of Present Illness     Patient presents at today's office visit for follow-up on her lymphadenopathy.  Patient has extensive lymphadenopathy on her neck, under her axillas, as well as in her inguinal area by her groin.  She recently had an ultrasound done which confirmed these multiple beaded lymphadenopathy.  Patient has had this for about 6 months, as she is telling me this today.  She has been worked up for numerous autoimmune/rheumatological disorders, which is come back negative.  It is unclear the cause for this.  Based off the ultrasound that was recently conducted, it was suggested for patient to have a CT scan with contrast for the neck, chest, and abdomen and pelvis.    Patient had a history of having a bulging lumbar disc, and she was given Robaxin to see how she would respond.  Patient apparently responded very well to the medicine, notes that she does well with this medicine.  She states that occasionally she does have some pain, and taking the Robaxin does seem to help alleviate it..    Patient does have a history of having macrocytosis.  Patient also has a history of having a vegetarian diet.  It is unclear whether some of the neck cytosis may be from a vitamin B12 deficiency?  I have discussed with patient at today's visit that I would like her to get a B12, folate acid, and methylmalonic acid labs done.  Patient is okay with the plan.      The following portions of the patient's history were reviewed and updated as appropriate: allergies, current medications, past family history, past medical history, past social history, past surgical history and problem list.    Review of Systems   Constitutional: Negative for chills and fever.   HENT: Negative for congestion, rhinorrhea, sinus pain and sore throat.    Eyes: Negative for photophobia and visual disturbance.   Respiratory:  Negative for cough, chest tightness and shortness of breath.    Cardiovascular: Negative for chest pain and palpitations.   Gastrointestinal: Negative for diarrhea, nausea and vomiting.   Genitourinary: Negative for dysuria, frequency and urgency.   Skin: Negative for rash and wound.   Neurological: Negative for dizziness and syncope.   Psychiatric/Behavioral: Negative for behavioral problems and confusion.       Objective   Physical Exam  Vitals signs and nursing note reviewed.   Constitutional:       Appearance: She is well-developed.   HENT:      Head: Normocephalic and atraumatic.      Right Ear: External ear normal.      Left Ear: External ear normal.   Neck:      Musculoskeletal: Normal range of motion and neck supple.   Cardiovascular:      Rate and Rhythm: Normal rate and regular rhythm.      Heart sounds: Normal heart sounds.   Pulmonary:      Effort: Pulmonary effort is normal. No respiratory distress.      Breath sounds: Normal breath sounds.   Musculoskeletal: Normal range of motion.   Lymphadenopathy:      Cervical: No cervical adenopathy.   Skin:     General: Skin is warm.      Comments: Lymphadenopathy palpated behind patient's neck, and on the waistline.   Neurological:      Mental Status: She is alert and oriented to person, place, and time.   Psychiatric:         Behavior: Behavior normal.         Vitals:    10/30/20 1243   BP: 100/60   Pulse: 119   Resp: 16   Temp: 97.1 °F (36.2 °C)   SpO2: 99%     Body mass index is 17.92 kg/m².      Assessment/Plan   Diagnoses and all orders for this visit:    1. Lymphadenopathy (Primary)    2. Bulging lumbar disc  -     methocarbamol (ROBAXIN) 500 MG tablet; Take 1 tablet by mouth 4 (Four) Times a Day As Needed for Muscle Spasms.  Dispense: 120 tablet; Refill: 0    3. Macrocytosis without anemia    I discussed with patient at today's office visit it is unclear why she is having his lymphadenopathy.  Patient is very frustrated by not having an appropriate  diagnosis, and is unclear exactly the cause.  We will proceed and continue to get the CT scan, to see if we get further answers.  CT scan would include the chest, neck, and abdomen and pelvis.  It is to be done with IV contrast and oral contrast.  On patient's most recent labs, she was found to have macrocytosis, but did not have anemia.  She does have a long history of having been vegetarian, so is unclear whether she has some underlying vitamin B12 deficiency.  I discussed with her that I would like to check a vitamin B12 deficiency and folic acid levels.  Patient had a history of having a bulging lumbar disc, and she is stable on using Robaxin as needed.      No follow-ups on file.    Dictated utilizing Dragon Voice Recognition Software

## 2020-11-06 ENCOUNTER — HOSPITAL ENCOUNTER (OUTPATIENT)
Dept: CT IMAGING | Facility: HOSPITAL | Age: 30
Discharge: HOME | End: 2020-11-06
Admitting: FAMILY MEDICINE

## 2020-11-06 ENCOUNTER — APPOINTMENT (OUTPATIENT)
Dept: CT IMAGING | Facility: HOSPITAL | Age: 30
End: 2020-11-06

## 2020-11-06 DIAGNOSIS — R59.1 LYMPHADENOPATHY: ICD-10-CM

## 2020-11-06 PROCEDURE — 70491 CT SOFT TISSUE NECK W/DYE: CPT

## 2020-11-06 PROCEDURE — 25010000002 IOPAMIDOL 61 % SOLUTION: Performed by: FAMILY MEDICINE

## 2020-11-06 PROCEDURE — 71260 CT THORAX DX C+: CPT

## 2020-11-06 PROCEDURE — 74177 CT ABD & PELVIS W/CONTRAST: CPT

## 2020-11-06 RX ADMIN — IOPAMIDOL 85 ML: 612 INJECTION, SOLUTION INTRAVENOUS at 14:32

## 2020-11-10 NOTE — PROGRESS NOTES
"Please inform the patient of the following abnormal result.  Has a right ovarian cyst, but lymphadenopathy seen in affected areas shows \"no lymhadenopathy\" on ct scan. Can discuss further plan and results during virtual visit later in the week."

## 2020-11-12 ENCOUNTER — OFFICE VISIT (OUTPATIENT)
Dept: INTERNAL MEDICINE | Facility: CLINIC | Age: 30
End: 2020-11-12

## 2020-11-12 DIAGNOSIS — R59.1 LYMPHADENOPATHY: Primary | ICD-10-CM

## 2020-11-12 PROCEDURE — 99443 PR PHYS/QHP TELEPHONE EVALUATION 21-30 MIN: CPT | Performed by: FAMILY MEDICINE

## 2020-11-13 NOTE — PROGRESS NOTES
"  Subjective   Dina Chacon is a 30 y.o. female.     No chief complaint on file.  cc lymphadenopathy.    This visit has been rescheduled as a phone visit to comply with patient safety concerns in accordance with CDC recommendations. Total time of discussion was 25 minutes.    You have chosen to receive care through a telephone visit. Do you consent to use a telephone visit for your medical care today? Yes        History of Present Illness     Patient has had a long history of having lymphadenopathy in several places in her groin, under arms, and neck. After the ultrasound that confirmed this, it was advised for her to under go a CT scan of multiple areas. CT scan shows: \"There is no lymphadenopathy within the neck, chest, abdomen, or pelvis. Splenic size is normal. There is a 4.2 cm right ovarian cyst. There is also a tiny amount of free fluid within the pelvis which is within physiological range.\" At todays office visit patient is frustrated as she doesn't know the cause of these symptoms. However, she is willing to wait a while before any work up as everything seems to be negative currently.     The following portions of the patient's history were reviewed and updated as appropriate: allergies, current medications, past family history, past medical history, past social history, past surgical history and problem list.    Review of Systems   Constitutional: Negative for chills and fever.   HENT: Negative for congestion, rhinorrhea, sinus pain and sore throat.    Eyes: Negative for photophobia and visual disturbance.   Respiratory: Negative for cough, chest tightness and shortness of breath.    Cardiovascular: Negative for chest pain and palpitations.   Gastrointestinal: Negative for diarrhea, nausea and vomiting.   Genitourinary: Negative for dysuria, frequency and urgency.   Skin: Negative for rash and wound.   Neurological: Negative for dizziness and syncope.   Hematological: Positive for adenopathy. "   Psychiatric/Behavioral: Negative for behavioral problems and confusion.       Objective   Physical Exam  Vitals signs and nursing note reviewed.   Constitutional:       Appearance: She is well-developed.   HENT:      Head: Normocephalic and atraumatic.   Neurological:      Mental Status: She is alert and oriented to person, place, and time.   Psychiatric:         Behavior: Behavior normal.         There were no vitals filed for this visit.  There is no height or weight on file to calculate BMI.      Assessment/Plan   Diagnoses and all orders for this visit:    1. Lymphadenopathy (Primary)    Discussed with patient the findings on CT scan. Patient states she would like to hold off on further diagnostics at this time. I have discussed with patient that I would be ok with this as lymph nodes are still present clinically, and ultrasound confirmed this, but not seen on the ct scan to be pathologic. Labs have also been normal thus far. She has expressed seeing plastic surgeon she typically sees from time to time, and expressed interest in biopsy. I have discussed that I would be ok with that plan if she goes that route. Follow up with patient in 3-4 mos.     I have spent greater than 25 mins with patient at todays visit, with more than half the time spent in counselling patient about disease pathology and pharmacology used to treat the disease.       No follow-ups on file.    Dictated utilizing Dragon Voice Recognition Software

## 2021-03-31 ENCOUNTER — OFFICE VISIT (OUTPATIENT)
Dept: INTERNAL MEDICINE | Facility: CLINIC | Age: 31
End: 2021-03-31

## 2021-03-31 VITALS
SYSTOLIC BLOOD PRESSURE: 102 MMHG | HEART RATE: 104 BPM | WEIGHT: 105.9 LBS | OXYGEN SATURATION: 99 % | HEIGHT: 62 IN | DIASTOLIC BLOOD PRESSURE: 60 MMHG | BODY MASS INDEX: 19.49 KG/M2

## 2021-03-31 DIAGNOSIS — R59.1 LYMPHADENOPATHY: Primary | ICD-10-CM

## 2021-03-31 DIAGNOSIS — M25.50 ARTHRALGIA, UNSPECIFIED JOINT: ICD-10-CM

## 2021-03-31 DIAGNOSIS — L98.9 SKIN LESION: ICD-10-CM

## 2021-03-31 PROCEDURE — 99214 OFFICE O/P EST MOD 30 MIN: CPT | Performed by: FAMILY MEDICINE

## 2021-03-31 RX ORDER — DEXTROAMPHETAMINE SACCHARATE, AMPHETAMINE ASPARTATE, DEXTROAMPHETAMINE SULFATE AND AMPHETAMINE SULFATE 2.5; 2.5; 2.5; 2.5 MG/1; MG/1; MG/1; MG/1
10 TABLET ORAL DAILY
COMMUNITY
Start: 2021-02-11

## 2021-04-14 ENCOUNTER — OFFICE VISIT (OUTPATIENT)
Dept: INTERNAL MEDICINE | Facility: CLINIC | Age: 31
End: 2021-04-14

## 2021-04-14 VITALS
TEMPERATURE: 97 F | SYSTOLIC BLOOD PRESSURE: 98 MMHG | OXYGEN SATURATION: 99 % | RESPIRATION RATE: 16 BRPM | DIASTOLIC BLOOD PRESSURE: 66 MMHG | HEART RATE: 114 BPM

## 2021-04-14 DIAGNOSIS — B96.89 BACTERIAL VAGINOSIS: ICD-10-CM

## 2021-04-14 DIAGNOSIS — R59.1 LYMPHADENOPATHY: ICD-10-CM

## 2021-04-14 DIAGNOSIS — M51.36 BULGING LUMBAR DISC: ICD-10-CM

## 2021-04-14 DIAGNOSIS — N76.0 BACTERIAL VAGINOSIS: ICD-10-CM

## 2021-04-14 DIAGNOSIS — R59.9 GLANDS SWOLLEN: Primary | ICD-10-CM

## 2021-04-14 PROCEDURE — 99214 OFFICE O/P EST MOD 30 MIN: CPT | Performed by: FAMILY MEDICINE

## 2021-04-14 RX ORDER — METHOCARBAMOL 500 MG/1
500 TABLET, FILM COATED ORAL 4 TIMES DAILY PRN
Qty: 120 TABLET | Refills: 0 | Status: SHIPPED | OUTPATIENT
Start: 2021-04-14 | End: 2022-09-01 | Stop reason: SDUPTHER

## 2021-04-14 RX ORDER — METRONIDAZOLE 500 MG/1
500 TABLET ORAL 2 TIMES DAILY
Qty: 14 TABLET | Refills: 0 | Status: SHIPPED | OUTPATIENT
Start: 2021-04-14 | End: 2021-04-21

## 2021-04-14 NOTE — PROGRESS NOTES
Chief Complaint  Swollen Glands    Subjective          Dina Chacon presents to [facility]  History of Present Illness    Patient presents at today's office for follow-up on her swollen glands and some lymphadenopathy.  Last office visit it was determined that we could do a trial of 2 weeks of steroids.  She was started on a taper of Ernestina over the 2 weeks.  She initially felt perhaps she was getting better by day 3, however shortly after that she felt the same.  She does have several symptoms which may be concerning of some sort of rheumatological disorder, however she is seen the rheumatologist and were unable to have a pinpoint diagnosis.  She has had this longstanding swelling of these lymph nodes particularly around her neck, clavicle, and groin.  These have been confirmed on ultrasound in the past particular last year.  It was even recommended for follow-up CT scan which did not really show these lymph nodes being enlarge.    Objective   Vital Signs:   BP 98/66   Pulse 114   Temp 97 °F (36.1 °C) (Infrared)   Resp 16   SpO2 99%     Physical Exam  Vitals and nursing note reviewed.   Constitutional:       Appearance: She is well-developed.   HENT:      Head: Normocephalic and atraumatic.   Musculoskeletal:      Cervical back: Normal range of motion and neck supple.   Neurological:      Mental Status: She is alert and oriented to person, place, and time.   Psychiatric:         Behavior: Behavior normal.        Result Review :                 Assessment and Plan    Diagnoses and all orders for this visit:    1. Glands swollen (Primary)    2. Lymphadenopathy    Patient is following up at today's office visit for her swollen glands and lymphadenopathy.  Last office visit she was started on Ernestina over 2 weeks.  She did not respond to the medication as well as we would have hoped.  However at this time, I discussed with her that were out of many options.  We have discussed  options such as going to [facility] for further evaluation, as patient is adamant on looking for answers, and we have done multiple different test including seen multiple different specialist, and not finding much of any answer.  Patient states that she will look into different options such as [facility] or [place].      Follow Up   No follow-ups on file.  Patient was given instructions and counseling regarding her condition or for health maintenance advice. Please see specific information pulled into the AVS if appropriate.

## 2021-08-11 ENCOUNTER — TELEPHONE (OUTPATIENT)
Dept: INTERNAL MEDICINE | Facility: CLINIC | Age: 31
End: 2021-08-11

## 2021-08-11 NOTE — TELEPHONE ENCOUNTER
[facility] CALLED AND STATED INSURANCE IS NEEDING TO KNOW WHY PATIENT WAS ON DUEXIS 800-26.6 MG tablet           PLEASE ADVISE       [phone]

## 2022-09-01 ENCOUNTER — LAB (OUTPATIENT)
Dept: LAB | Facility: HOSPITAL | Age: 32
End: 2022-09-01

## 2022-09-01 ENCOUNTER — OFFICE VISIT (OUTPATIENT)
Dept: INTERNAL MEDICINE | Facility: CLINIC | Age: 32
End: 2022-09-01

## 2022-09-01 VITALS
HEART RATE: 120 BPM | WEIGHT: 102 LBS | SYSTOLIC BLOOD PRESSURE: 120 MMHG | BODY MASS INDEX: 18.77 KG/M2 | OXYGEN SATURATION: 98 % | DIASTOLIC BLOOD PRESSURE: 76 MMHG | HEIGHT: 62 IN | RESPIRATION RATE: 18 BRPM

## 2022-09-01 DIAGNOSIS — N39.0 ACUTE UTI: Primary | ICD-10-CM

## 2022-09-01 DIAGNOSIS — M51.36 BULGING LUMBAR DISC: ICD-10-CM

## 2022-09-01 LAB
BACTERIA UR QL AUTO: ABNORMAL /HPF
BILIRUB UR QL STRIP: NEGATIVE
CLARITY UR: ABNORMAL
COLOR UR: ABNORMAL
GLUCOSE UR STRIP-MCNC: NEGATIVE MG/DL
HGB UR QL STRIP.AUTO: ABNORMAL
HYALINE CASTS UR QL AUTO: ABNORMAL /LPF
KETONES UR QL STRIP: NEGATIVE
LEUKOCYTE ESTERASE UR QL STRIP.AUTO: ABNORMAL
NITRITE UR QL STRIP: NEGATIVE
PH UR STRIP.AUTO: 6.5 [PH] (ref 5–8)
PROT UR QL STRIP: ABNORMAL
RBC # UR STRIP: ABNORMAL /HPF
REF LAB TEST METHOD: ABNORMAL
SP GR UR STRIP: 1.02 (ref 1–1.03)
SQUAMOUS #/AREA URNS HPF: ABNORMAL /HPF
UROBILINOGEN UR QL STRIP: ABNORMAL
WBC # UR STRIP: ABNORMAL /HPF

## 2022-09-01 PROCEDURE — 81001 URINALYSIS AUTO W/SCOPE: CPT | Performed by: FAMILY MEDICINE

## 2022-09-01 PROCEDURE — 99214 OFFICE O/P EST MOD 30 MIN: CPT | Performed by: FAMILY MEDICINE

## 2022-09-01 RX ORDER — METHOCARBAMOL 500 MG/1
500 TABLET, FILM COATED ORAL 4 TIMES DAILY PRN
Qty: 120 TABLET | Refills: 0 | Status: SHIPPED | OUTPATIENT
Start: 2022-09-01

## 2022-09-01 RX ORDER — CEFDINIR 300 MG/1
300 CAPSULE ORAL 2 TIMES DAILY
Qty: 14 CAPSULE | Refills: 0 | Status: SHIPPED | OUTPATIENT
Start: 2022-09-01 | End: 2022-09-08

## 2022-09-02 NOTE — PROGRESS NOTES
Please inform the patient of the following abnormal results. Has UTI. I have started her on omnicef.

## 2022-09-22 NOTE — PROGRESS NOTES
"Chief Complaint  No chief complaint on file.    Subjective        Dina Chacon presents to [facility]  History of Present Illness    Patient does have UTI symptoms for couple days now. She is not taking anything currently.     Patient continues to have low back pain, and uses robaxin. She notes the medication seems to help when she takes it.     Objective   Vital Signs:  /76   Pulse 120   Resp 18   Ht 157.5 cm (62\")   Wt 46.3 kg (102 lb)   SpO2 98%   BMI 18.66 kg/m²   Estimated body mass index is 18.66 kg/m² as calculated from the following:    Height as of this encounter: 157.5 cm (62\").    Weight as of this encounter: 46.3 kg (102 lb).    BMI is within normal parameters. No other follow-up for BMI required.      Physical Exam  Vitals and nursing note reviewed.   Constitutional:       Appearance: She is well-developed.   HENT:      Head: Normocephalic and atraumatic.   Musculoskeletal:      Cervical back: Normal range of motion and neck supple.   Neurological:      Mental Status: She is alert and oriented to person, place, and time.   Psychiatric:         Behavior: Behavior normal.        Result Review :                Assessment and Plan   Diagnoses and all orders for this visit:    1. Acute UTI (Primary)  -     Urinalysis With Microscopic - Urine, Clean Catch  -     Urine Culture - Urine, Urine, Clean Catch  -     cefdinir (OMNICEF) 300 MG capsule; Take 1 capsule by mouth 2 (Two) Times a Day for 7 days.  Dispense: 14 capsule; Refill: 0  -     Will tx with omnicef. Will get urinanalysis.     2. Bulging lumbar disc  -     methocarbamol (ROBAXIN) 500 MG tablet; Take 1 tablet by mouth 4 (Four) Times a Day As Needed for Muscle Spasms.  Dispense: 120 tablet; Refill: 0  -     Continue robaxin.              Follow Up   No follow-ups on file.  Patient was given instructions and counseling regarding her condition or for health maintenance advice. Please see specific " information pulled into the AVS if appropriate.

## 2022-10-12 ENCOUNTER — OFFICE VISIT (OUTPATIENT)
Dept: INTERNAL MEDICINE | Facility: CLINIC | Age: 32
End: 2022-10-12

## 2022-10-12 VITALS
SYSTOLIC BLOOD PRESSURE: 120 MMHG | DIASTOLIC BLOOD PRESSURE: 78 MMHG | HEART RATE: 91 BPM | WEIGHT: 106.9 LBS | OXYGEN SATURATION: 98 % | BODY MASS INDEX: 19.67 KG/M2 | HEIGHT: 62 IN | RESPIRATION RATE: 18 BRPM

## 2022-10-12 DIAGNOSIS — Z00.00 HEALTHCARE MAINTENANCE: ICD-10-CM

## 2022-10-12 DIAGNOSIS — Z00.00 WELL WOMAN EXAM (NO GYNECOLOGICAL EXAM): Primary | ICD-10-CM

## 2022-10-12 PROCEDURE — 99395 PREV VISIT EST AGE 18-39: CPT | Performed by: FAMILY MEDICINE

## 2022-10-12 RX ORDER — NITROFURANTOIN 25; 75 MG/1; MG/1
CAPSULE ORAL
COMMUNITY
Start: 2022-08-17

## 2022-10-12 NOTE — PROGRESS NOTES
Subjective   Dina Chacon is a 32 y.o. female and is here for a comprehensive physical exam. The patient reports no problems.    Pt is UTD with annual gyn exam.          Social History:   Social History     Socioeconomic History   • Marital status:    Tobacco Use   • Smoking status: Former     Packs/day: 0.50     Years: 0.00     Pack years: 0.00     Types: Cigarettes   • Smokeless tobacco: Never   Substance and Sexual Activity   • Alcohol use: Yes     Comment: social drinker   • Drug use: No   • Sexual activity: Yes     Partners: Male     Birth control/protection: None       Family History:   Family History   Problem Relation Age of Onset   • Multiple sclerosis Other    • Malig Hyperthermia Neg Hx        Past Medical History:   Past Medical History:   Diagnosis Date   • ADHD (attention deficit hyperactivity disorder)    • Allergic    • GERD (gastroesophageal reflux disease) 02/03/2020   • Head ache    • Injury of back    • Low back pain    • Numbness and tingling     right leg and foot   • PONV (postoperative nausea and vomiting)    • Right leg pain        The following portions of the patient's history were reviewed and updated as appropriate: allergies, current medications, past family history, past medical history, past social history, past surgical history and problem list.    Review of Systems    Review of Systems   Constitutional: Negative for chills and fever.   HENT: Negative for congestion, rhinorrhea, sinus pain and sore throat.    Eyes: Negative for photophobia and visual disturbance.   Respiratory: Negative for cough, chest tightness and shortness of breath.    Cardiovascular: Negative for chest pain and palpitations.   Gastrointestinal: Negative for diarrhea, nausea and vomiting.   Genitourinary: Negative for dysuria, frequency and urgency.   Skin: Negative for rash and wound.   Neurological: Negative for dizziness and syncope.   Psychiatric/Behavioral: Negative for behavioral problems and  confusion.       Objective   Physical Exam  Vitals and nursing note reviewed.   Constitutional:       Appearance: She is well-developed.   HENT:      Head: Normocephalic and atraumatic.      Right Ear: External ear normal.      Left Ear: External ear normal.   Cardiovascular:      Rate and Rhythm: Normal rate and regular rhythm.      Heart sounds: Normal heart sounds.   Pulmonary:      Effort: Pulmonary effort is normal. No respiratory distress.      Breath sounds: Normal breath sounds.   Abdominal:      Palpations: Abdomen is soft.      Tenderness: There is no abdominal tenderness. There is no guarding.   Musculoskeletal:         General: Normal range of motion.      Cervical back: Normal range of motion and neck supple.   Lymphadenopathy:      Cervical: No cervical adenopathy.   Skin:     General: Skin is warm.   Neurological:      Mental Status: She is alert and oriented to person, place, and time.   Psychiatric:         Behavior: Behavior normal.         Vitals:    10/12/22 0950   BP: 120/78   Pulse: 91   Resp: 18   SpO2: 98%     Body mass index is 19.55 kg/m².      Medications:   Current Outpatient Medications:   •  ADDERALL XR 20 MG 24 hr capsule, Take 20 mg by mouth Daily  , Disp: , Rfl:   •  amphetamine-dextroamphetamine (ADDERALL) 10 MG tablet, Take 10 mg by mouth Daily., Disp: , Rfl:   •  methocarbamol (ROBAXIN) 500 MG tablet, Take 1 tablet by mouth 4 (Four) Times a Day As Needed for Muscle Spasms., Disp: 120 tablet, Rfl: 0  •  ACZONE 7.5 % gel, , Disp: , Rfl:   •  cyanocobalamin 1000 MCG/ML injection, Inject 1 mL into the appropriate muscle as directed by prescriber Every 28 (Twenty-Eight) Days., Disp: 3 mL, Rfl: 6  •  nitrofurantoin, macrocrystal-monohydrate, (MACROBID) 100 MG capsule, , Disp: , Rfl:     Current Facility-Administered Medications:   •  cyanocobalamin injection 1,000 mcg, 1,000 mcg, Intramuscular, Q28 Days, Juan Jose Mcgee MD, 1,000 mcg at 02/21/19 1151       Assessment & Plan   Healthy  female exam.      1. Healthcare Maintenance:  2. Patient Counseling:  --Nutrition: Stressed importance of moderation in sodium/caffeine intake, saturated fat and cholesterol, caloric balance, sufficient intake of fresh fruits, vegetables, fiber, calcium and vit D  --Exercise: Recommended 30 minutes of exercise daily.  --Immunizations reviewed.      Diagnoses and all orders for this visit:    Well woman exam (no gynecological exam)    Healthcare maintenance  -     Cancel: CBC & Differential; Future  -     Cancel: Comprehensive Metabolic Panel; Future  -     Cancel: Hemoglobin A1c; Future  -     Cancel: Thyroid Panel With TSH; Future  -     Cancel: Lipid Panel With LDL / HDL Ratio; Future  -     Cancel: Hepatitis C Antibody; Future  -     Cancel: Urinalysis With Microscopic - Urine, Clean Catch; Future  -     Cancel: Vitamin B12  -     Cancel: Methylmalonic Acid, Serum  -     Cancel: Folate; Future  -     Cancel: Vitamin B12; Future  -     Cancel: Methylmalonic Acid, Serum; Future  -     Methylmalonic Acid, Serum; Future  -     Vitamin B12; Future  -     Folate; Future  -     Urinalysis With Microscopic - Urine, Clean Catch; Future  -     Hepatitis C Antibody; Future  -     Lipid Panel With LDL / HDL Ratio; Future  -     Thyroid Panel With TSH; Future  -     Hemoglobin A1c; Future  -     Comprehensive Metabolic Panel; Future  -     CBC & Differential; Future    Other orders  -     nitrofurantoin, macrocrystal-monohydrate, (MACROBID) 100 MG capsule        No follow-ups on file.             Dictated utilizing Dragon Voice Recognition Software

## 2022-10-15 LAB
ALBUMIN SERPL-MCNC: 5.5 G/DL (ref 3.5–5.2)
ALBUMIN/GLOB SERPL: 3.7 G/DL
ALP SERPL-CCNC: 75 U/L (ref 39–117)
ALT SERPL-CCNC: 24 U/L (ref 1–33)
APPEARANCE UR: CLEAR
AST SERPL-CCNC: 35 U/L (ref 1–32)
BACTERIA #/AREA URNS HPF: NORMAL /HPF
BASOPHILS # BLD AUTO: 0.04 10*3/MM3 (ref 0–0.2)
BASOPHILS NFR BLD AUTO: 0.7 % (ref 0–1.5)
BILIRUB SERPL-MCNC: 0.4 MG/DL (ref 0–1.2)
BILIRUB UR QL STRIP: NEGATIVE
BUN SERPL-MCNC: 5 MG/DL (ref 6–20)
BUN/CREAT SERPL: 8.6 (ref 7–25)
CALCIUM SERPL-MCNC: 9.9 MG/DL (ref 8.6–10.5)
CASTS URNS MICRO: NORMAL
CHLORIDE SERPL-SCNC: 102 MMOL/L (ref 98–107)
CHOLEST SERPL-MCNC: 190 MG/DL (ref 0–200)
CO2 SERPL-SCNC: 26.9 MMOL/L (ref 22–29)
COLOR UR: YELLOW
CREAT SERPL-MCNC: 0.58 MG/DL (ref 0.57–1)
EGFRCR SERPLBLD CKD-EPI 2021: 123.5 ML/MIN/1.73
EOSINOPHIL # BLD AUTO: 0.07 10*3/MM3 (ref 0–0.4)
EOSINOPHIL NFR BLD AUTO: 1.2 % (ref 0.3–6.2)
EPI CELLS #/AREA URNS HPF: NORMAL /HPF
ERYTHROCYTE [DISTWIDTH] IN BLOOD BY AUTOMATED COUNT: 11.9 % (ref 12.3–15.4)
FOLATE SERPL-MCNC: 18.3 NG/ML (ref 4.78–24.2)
FT4I SERPL CALC-MCNC: 2 (ref 1.2–4.9)
GLOBULIN SER CALC-MCNC: 1.5 GM/DL
GLUCOSE SERPL-MCNC: 102 MG/DL (ref 65–99)
GLUCOSE UR QL STRIP: NEGATIVE
HBA1C MFR BLD: 5 % (ref 4.8–5.6)
HCT VFR BLD AUTO: 43 % (ref 34–46.6)
HCV AB S/CO SERPL IA: <0.1 S/CO RATIO (ref 0–0.9)
HDLC SERPL-MCNC: 75 MG/DL (ref 40–60)
HGB BLD-MCNC: 14.7 G/DL (ref 12–15.9)
HGB UR QL STRIP: NEGATIVE
IMM GRANULOCYTES # BLD AUTO: 0.01 10*3/MM3 (ref 0–0.05)
IMM GRANULOCYTES NFR BLD AUTO: 0.2 % (ref 0–0.5)
KETONES UR QL STRIP: NEGATIVE
LDLC SERPL CALC-MCNC: 106 MG/DL (ref 0–100)
LDLC/HDLC SERPL: 1.42 {RATIO}
LEUKOCYTE ESTERASE UR QL STRIP: NEGATIVE
LYMPHOCYTES # BLD AUTO: 1.79 10*3/MM3 (ref 0.7–3.1)
LYMPHOCYTES NFR BLD AUTO: 30.9 % (ref 19.6–45.3)
MCH RBC QN AUTO: 34.7 PG (ref 26.6–33)
MCHC RBC AUTO-ENTMCNC: 34.2 G/DL (ref 31.5–35.7)
MCV RBC AUTO: 101.4 FL (ref 79–97)
METHYLMALONATE SERPL-SCNC: 423 NMOL/L (ref 0–378)
MONOCYTES # BLD AUTO: 0.33 10*3/MM3 (ref 0.1–0.9)
MONOCYTES NFR BLD AUTO: 5.7 % (ref 5–12)
NEUTROPHILS # BLD AUTO: 3.56 10*3/MM3 (ref 1.7–7)
NEUTROPHILS NFR BLD AUTO: 61.3 % (ref 42.7–76)
NITRITE UR QL STRIP: NEGATIVE
NRBC BLD AUTO-RTO: 0 /100 WBC (ref 0–0.2)
PH UR STRIP: 7 [PH] (ref 5–8)
PLATELET # BLD AUTO: 321 10*3/MM3 (ref 140–450)
POTASSIUM SERPL-SCNC: 4.7 MMOL/L (ref 3.5–5.2)
PROT SERPL-MCNC: 7 G/DL (ref 6–8.5)
PROT UR QL STRIP: NEGATIVE
RBC # BLD AUTO: 4.24 10*6/MM3 (ref 3.77–5.28)
RBC #/AREA URNS HPF: NORMAL /HPF
SODIUM SERPL-SCNC: 140 MMOL/L (ref 136–145)
SP GR UR STRIP: 1.01 (ref 1–1.03)
T3RU NFR SERPL: 23 % (ref 24–39)
T4 SERPL-MCNC: 8.7 UG/DL (ref 4.5–12)
TRIGL SERPL-MCNC: 44 MG/DL (ref 0–150)
TSH SERPL DL<=0.005 MIU/L-ACNC: 2.14 UIU/ML (ref 0.45–4.5)
UROBILINOGEN UR STRIP-MCNC: NORMAL MG/DL
VIT B12 SERPL-MCNC: 196 PG/ML (ref 211–946)
VLDLC SERPL CALC-MCNC: 9 MG/DL (ref 5–40)
WBC # BLD AUTO: 5.8 10*3/MM3 (ref 3.4–10.8)
WBC #/AREA URNS HPF: NORMAL /HPF

## 2022-10-16 DIAGNOSIS — E53.8 VITAMIN B12 DEFICIENCY: Primary | ICD-10-CM

## 2022-10-16 NOTE — PROGRESS NOTES
Please inform the patient of the following abnormal results. Low vitamin b12. She needs to come in for low vitamin b12 and get monthly vitamin b12 shots.

## 2024-04-01 NOTE — TELEPHONE ENCOUNTER
Dr Oviedo pt is requesting a call back from Karine or Dr Oviedo. Pt had a Hip MRI 4/10/109 were a pelvic mass was found. Pt states the radiologist recommended her to f/u w Dr Oviedo within 4-6 wks w a TVUS yet pt is requesting to speak with Karine to possibly be seen sooner. Pt is really nervous that she may have cancer. Please advise.   
May 6th is fine, looks like a small ovarian cyst and very unlikely to be cancerous
OK THANKS SPOKE TO PT.  
SCHEDULED PT FOR MAY 6TH. DUE TO NO OPENINGS NEXT WEEK WITH [facility] (NOT EVEN ANY WORK IN [facility] AVAILABLE). WANTS YOU TO TAKE A GLANCE AT MRI (DONE AT [facility]). JUST WANT TO MAKE SURE THE DATE OF HER APPT IS OK.  
24

## 2024-10-31 ENCOUNTER — APPOINTMENT (OUTPATIENT)
Dept: CT IMAGING | Facility: HOSPITAL | Age: 34
End: 2024-10-31
Payer: COMMERCIAL

## 2024-10-31 ENCOUNTER — HOSPITAL ENCOUNTER (EMERGENCY)
Facility: HOSPITAL | Age: 34
Discharge: HOME | End: 2024-10-31
Attending: EMERGENCY MEDICINE
Payer: COMMERCIAL

## 2024-10-31 VITALS
RESPIRATION RATE: 18 BRPM | SYSTOLIC BLOOD PRESSURE: 102 MMHG | OXYGEN SATURATION: 98 % | HEIGHT: 62 IN | HEART RATE: 71 BPM | WEIGHT: 102 LBS | BODY MASS INDEX: 18.77 KG/M2 | DIASTOLIC BLOOD PRESSURE: 73 MMHG | TEMPERATURE: 98.2 F

## 2024-10-31 DIAGNOSIS — G43.009 ATYPICAL MIGRAINE: Primary | ICD-10-CM

## 2024-10-31 DIAGNOSIS — H53.8 BLURRY VISION, RIGHT EYE: ICD-10-CM

## 2024-10-31 LAB
ALBUMIN SERPL-MCNC: 4.4 G/DL (ref 3.5–5.2)
ALBUMIN/GLOB SERPL: 1.8 G/DL
ALP SERPL-CCNC: 75 U/L (ref 39–117)
ALT SERPL W P-5'-P-CCNC: 32 U/L (ref 1–33)
ANION GAP SERPL CALCULATED.3IONS-SCNC: 10 MMOL/L (ref 5–15)
AST SERPL-CCNC: 23 U/L (ref 1–32)
B-HCG UR QL: NEGATIVE
BASOPHILS # BLD AUTO: 0.01 10*3/MM3 (ref 0–0.2)
BASOPHILS NFR BLD AUTO: 0.2 % (ref 0–1.5)
BILIRUB SERPL-MCNC: 0.2 MG/DL (ref 0–1.2)
BUN BLDA-MCNC: 8 MG/DL (ref 8–26)
BUN SERPL-MCNC: 8 MG/DL (ref 6–20)
BUN/CREAT SERPL: 12.7 (ref 7–25)
CA-I BLDA-SCNC: 1.16 MMOL/L (ref 1.15–1.33)
CALCIUM SPEC-SCNC: 9.3 MG/DL (ref 8.6–10.5)
CHLORIDE BLDA-SCNC: 104 MMOL/L (ref 98–107)
CHLORIDE SERPL-SCNC: 103 MMOL/L (ref 98–107)
CO2 BLDA-SCNC: 28.6 MMOL/L (ref 23–27)
CO2 SERPL-SCNC: 27 MMOL/L (ref 22–29)
CREAT BLDA-MCNC: 0.51 MG/DL (ref 0.51–1.19)
CREAT SERPL-MCNC: 0.63 MG/DL (ref 0.57–1)
DEPRECATED RDW RBC AUTO: 43.1 FL (ref 37–54)
EGFRCR SERPLBLD CKD-EPI 2021: 119.6 ML/MIN/1.73
EOSINOPHIL # BLD AUTO: 0.27 10*3/MM3 (ref 0–0.4)
EOSINOPHIL NFR BLD AUTO: 4.2 % (ref 0.3–6.2)
ERYTHROCYTE [DISTWIDTH] IN BLOOD BY AUTOMATED COUNT: 11.6 % (ref 12.3–15.4)
GLOBULIN UR ELPH-MCNC: 2.5 GM/DL
GLUCOSE BLDC GLUCOMTR-MCNC: 95 MG/DL (ref 74–100)
GLUCOSE SERPL-MCNC: 88 MG/DL (ref 65–99)
HCT VFR BLD AUTO: 39.9 % (ref 34–46.6)
HGB BLD-MCNC: 13.9 G/DL (ref 12–15.9)
IMM GRANULOCYTES # BLD AUTO: 0 10*3/MM3 (ref 0–0.05)
IMM GRANULOCYTES NFR BLD AUTO: 0 % (ref 0–0.5)
LYMPHOCYTES # BLD AUTO: 1.94 10*3/MM3 (ref 0.7–3.1)
LYMPHOCYTES NFR BLD AUTO: 29.9 % (ref 19.6–45.3)
MCH RBC QN AUTO: 34.7 PG (ref 26.6–33)
MCHC RBC AUTO-ENTMCNC: 34.8 G/DL (ref 31.5–35.7)
MCV RBC AUTO: 99.5 FL (ref 79–97)
MONOCYTES # BLD AUTO: 0.42 10*3/MM3 (ref 0.1–0.9)
MONOCYTES NFR BLD AUTO: 6.5 % (ref 5–12)
NEUTROPHILS NFR BLD AUTO: 3.84 10*3/MM3 (ref 1.7–7)
NEUTROPHILS NFR BLD AUTO: 59.2 % (ref 42.7–76)
PLATELET # BLD AUTO: 346 10*3/MM3 (ref 140–450)
PMV BLD AUTO: 9 FL (ref 6–12)
POTASSIUM BLDA-SCNC: 2.9 MMOL/L (ref 3.5–4.5)
POTASSIUM SERPL-SCNC: 3 MMOL/L (ref 3.5–5.2)
PROT SERPL-MCNC: 6.9 G/DL (ref 6–8.5)
QT INTERVAL: 418 MS
QTC INTERVAL: 448 MS
RBC # BLD AUTO: 4.01 10*6/MM3 (ref 3.77–5.28)
SODIUM BLD-SCNC: 141 MMOL/L (ref 138–146)
SODIUM SERPL-SCNC: 140 MMOL/L (ref 136–145)
WBC NRBC COR # BLD AUTO: 6.48 10*3/MM3 (ref 3.4–10.8)

## 2024-10-31 PROCEDURE — 99285 EMERGENCY DEPT VISIT HI MDM: CPT

## 2024-10-31 PROCEDURE — 85025 COMPLETE CBC W/AUTO DIFF WBC: CPT | Performed by: NURSE PRACTITIONER

## 2024-10-31 PROCEDURE — 70496 CT ANGIOGRAPHY HEAD: CPT

## 2024-10-31 PROCEDURE — 70498 CT ANGIOGRAPHY NECK: CPT

## 2024-10-31 PROCEDURE — 96375 TX/PRO/DX INJ NEW DRUG ADDON: CPT

## 2024-10-31 PROCEDURE — 93005 ELECTROCARDIOGRAM TRACING: CPT | Performed by: NURSE PRACTITIONER

## 2024-10-31 PROCEDURE — 96365 THER/PROPH/DIAG IV INF INIT: CPT

## 2024-10-31 PROCEDURE — 81025 URINE PREGNANCY TEST: CPT | Performed by: NURSE PRACTITIONER

## 2024-10-31 PROCEDURE — 25010000002 DIPHENHYDRAMINE PER 50 MG: Performed by: NURSE PRACTITIONER

## 2024-10-31 PROCEDURE — 25010000002 PROCHLORPERAZINE 10 MG/2ML SOLUTION: Performed by: NURSE PRACTITIONER

## 2024-10-31 PROCEDURE — 25010000002 KETOROLAC TROMETHAMINE PER 15 MG: Performed by: NURSE PRACTITIONER

## 2024-10-31 PROCEDURE — 80053 COMPREHEN METABOLIC PANEL: CPT | Performed by: NURSE PRACTITIONER

## 2024-10-31 PROCEDURE — 25010000002 MAGNESIUM SULFATE 2 GM/50ML SOLUTION: Performed by: NURSE PRACTITIONER

## 2024-10-31 PROCEDURE — 25510000001 IOPAMIDOL PER 1 ML: Performed by: EMERGENCY MEDICINE

## 2024-10-31 RX ORDER — PROCHLORPERAZINE EDISYLATE 5 MG/ML
10 INJECTION INTRAMUSCULAR; INTRAVENOUS ONCE
Status: COMPLETED | OUTPATIENT
Start: 2024-10-31 | End: 2024-10-31

## 2024-10-31 RX ORDER — IOPAMIDOL 755 MG/ML
100 INJECTION, SOLUTION INTRAVASCULAR
Status: COMPLETED | OUTPATIENT
Start: 2024-10-31 | End: 2024-10-31

## 2024-10-31 RX ORDER — MAGNESIUM SULFATE HEPTAHYDRATE 40 MG/ML
2 INJECTION, SOLUTION INTRAVENOUS ONCE
Status: COMPLETED | OUTPATIENT
Start: 2024-10-31 | End: 2024-10-31

## 2024-10-31 RX ORDER — DIPHENHYDRAMINE HYDROCHLORIDE 50 MG/ML
25 INJECTION INTRAMUSCULAR; INTRAVENOUS ONCE
Status: COMPLETED | OUTPATIENT
Start: 2024-10-31 | End: 2024-10-31

## 2024-10-31 RX ORDER — KETOROLAC TROMETHAMINE 15 MG/ML
15 INJECTION, SOLUTION INTRAMUSCULAR; INTRAVENOUS ONCE
Status: COMPLETED | OUTPATIENT
Start: 2024-10-31 | End: 2024-10-31

## 2024-10-31 RX ORDER — POTASSIUM CHLORIDE 750 MG/1
40 TABLET, FILM COATED, EXTENDED RELEASE ORAL ONCE
Status: COMPLETED | OUTPATIENT
Start: 2024-10-31 | End: 2024-10-31

## 2024-10-31 RX ORDER — SODIUM CHLORIDE 0.9 % (FLUSH) 0.9 %
10 SYRINGE (ML) INJECTION AS NEEDED
Status: DISCONTINUED | OUTPATIENT
Start: 2024-10-31 | End: 2024-10-31 | Stop reason: HOSPADM

## 2024-10-31 RX ADMIN — PROCHLORPERAZINE EDISYLATE 10 MG: 5 INJECTION INTRAMUSCULAR; INTRAVENOUS at 14:55

## 2024-10-31 RX ADMIN — DIPHENHYDRAMINE HYDROCHLORIDE 25 MG: 50 INJECTION, SOLUTION INTRAMUSCULAR; INTRAVENOUS at 14:52

## 2024-10-31 RX ADMIN — MAGNESIUM SULFATE HEPTAHYDRATE 2 G: 40 INJECTION, SOLUTION INTRAVENOUS at 14:57

## 2024-10-31 RX ADMIN — IOPAMIDOL 80 ML: 755 INJECTION, SOLUTION INTRAVENOUS at 15:39

## 2024-10-31 RX ADMIN — KETOROLAC TROMETHAMINE 15 MG: 15 INJECTION, SOLUTION INTRAMUSCULAR; INTRAVENOUS at 16:17

## 2024-10-31 RX ADMIN — POTASSIUM CHLORIDE 40 MEQ: 750 TABLET, EXTENDED RELEASE ORAL at 15:51

## 2024-10-31 NOTE — FSED PROVIDER NOTE
EMERGENCY DEPARTMENT ENCOUNTER    Room Number:  04/04  Date seen:  10/31/2024  Time seen: 14:19 EDT  PCP: Juan Jose Mcgee MD  Historian: patient    Discussed/obtained information from independent historians: n/a    HPI:  Chief complaint:left eye blurry vision and pressure.  Post op rash  A complete HPI/ROS/PMH/PSH/SH/FH are unobtainable due to: n/a  Context:Dina Chacon is a 34 y.o. female who presents to the ED with c/o acute onset of noticing blurry vision right eye this past Saturday evening (10/26/24).  Pt had breast implant remove/replace on 10/25/24 with Dr. Esteves as outpatient and did well.  She did have a scopolamine patch in place behind right ear and it was removed Sunday.  She states the blurry vision improved but then has returned several times since then and now she has pressure behind the left eye that is mild, not quite described as headache.  She denies any other type of headache, unilateral weakness, abnormal sensation, double vision or loss of vision.  She also has a bit of contact dermatitis to either side of her inframammary breast incisions that is mildly itchy.     External (non-ED) record review:  I reviewed pt's operative report 10/25/2024. No recent PCP visits in EPIC    Chronic or social conditions impacting care:n/a    ALLERGIES  Gelatin and Sulfa antibiotics    PAST MEDICAL HISTORY  Active Ambulatory Problems     Diagnosis Date Noted    Bursitis of left foot 04/24/2017    Pain of toe of left foot 04/24/2017    Adnexal mass 04/30/2019    Surgery follow-up examination 06/19/2019    Status post laminectomy 06/23/2019    ADHD (attention deficit hyperactivity disorder) 09/17/2019    Vitamin B12 deficiency 09/17/2019    Lymphadenopathy 10/30/2020    Macrocytosis without anemia 10/30/2020    Arthralgia 03/31/2021    Bacterial vaginosis 04/14/2021     Resolved Ambulatory Problems     Diagnosis Date Noted    Severe low back pain 04/11/2019    Neuritis or radiculitis due to  rupture of lumbar intervertebral disc 2019     Past Medical History:   Diagnosis Date    Allergic     GERD (gastroesophageal reflux disease) 2020    Head ache     Injury of back     Low back pain     Numbness and tingling     PONV (postoperative nausea and vomiting)     Right leg pain        PAST SURGICAL HISTORY  Past Surgical History:   Procedure Laterality Date    BREAST SURGERY  2009    augmentation    BREAST SURGERY  2016    REPAIR (LEAK)    BREAST SURGERY Bilateral 10/25/2024     SECTION       SECTION       SECTION  2008    COLONOSCOPY  2015    COLPOSCOPY  2014    LEEP  2014    LUMBAR DISCECTOMY Right 2019    Procedure: Right L5-S1 laminectomy and discectomy with metrx;  Surgeon: En Edouard MD;  Location: [facility];  Service: Neurosurgery    NOSE SURGERY      NASAL RECONSTRUCTION    RHINOPLASTY  2017    RHINO/SEPTOPLASTY       FAMILY HISTORY  Family History   Problem Relation Age of Onset    Multiple sclerosis Other     Malig Hyperthermia Neg Hx        SOCIAL HISTORY  Social History     Socioeconomic History    Marital status:    Tobacco Use    Smoking status: Some Days     Current packs/day: 0.50     Types: Cigarettes    Smokeless tobacco: Never   Substance and Sexual Activity    Alcohol use: Yes     Comment: social drinker    Drug use: No    Sexual activity: Yes     Partners: Male     Birth control/protection: None       REVIEW OF SYSTEMS  Review of Systems    All systems reviewed and negative except for those discussed in HPI.     PHYSICAL EXAM    I have reviewed the triage vital signs and nursing notes.  Vitals:    10/31/24 1625   BP: 102/73   Pulse: 71   Resp:    Temp:    SpO2: 98%     Physical Exam    GENERAL: not distressed  HENT: nares patent, no facial droop  EYES: no scleral icterus, PERRL, EOMI, no anisocoria, no nystagmus  NECK: no ROM limitations  CV: regular rhythm, regular rate  RESPIRATORY: normal effort.  Pt's  inframammary incisions CDI with steri strip.  Small amount of contact dermatitis surrounding the steri strips.  Breasts are soft.  No large ecchymosis.   ABDOMEN: soft  : deferred  MUSCULOSKELETAL: no deformity  NEURO: alert, moves all extremities, follows commands  SKIN: warm, dry    NIHSS:  0-->Alert: keenly responsive  0-->Answers both questions correctly  0-->Performs both tasks correctly  0=normal  0=No visual loss  0=Normal symmetric movement  0-->No drift: limb holds 90 (or 45) degrees for full 10 secs  0-->No drift: limb holds 90 (or 45) degrees for full 10 secs  0-->No drift: limb holds 90 (or 45) degrees for full 10 secs  0-->No drift: limb holds 90 (or 45) degrees for full 10 secs  0=Absent  0=Normal; no sensory loss  0=No aphasia, normal  0=Normal  0=No abnormality  Total score: 0      LAB RESULTS  Recent Results (from the past 24 hours)   Comprehensive Metabolic Panel    Collection Time: 10/31/24  2:39 PM    Specimen: Blood   Result Value Ref Range    Glucose 88 65 - 99 mg/dL    BUN 8 6 - 20 mg/dL    Creatinine 0.63 0.57 - 1.00 mg/dL    Sodium 140 136 - 145 mmol/L    Potassium 3.0 (L) 3.5 - 5.2 mmol/L    Chloride 103 98 - 107 mmol/L    CO2 27.0 22.0 - 29.0 mmol/L    Calcium 9.3 8.6 - 10.5 mg/dL    Total Protein 6.9 6.0 - 8.5 g/dL    Albumin 4.4 3.5 - 5.2 g/dL    ALT (SGPT) 32 1 - 33 U/L    AST (SGOT) 23 1 - 32 U/L    Alkaline Phosphatase 75 39 - 117 U/L    Total Bilirubin 0.2 0.0 - 1.2 mg/dL    Globulin 2.5 gm/dL    A/G Ratio 1.8 g/dL    BUN/Creatinine Ratio 12.7 7.0 - 25.0    Anion Gap 10.0 5.0 - 15.0 mmol/L    eGFR 119.6 >60.0 mL/min/1.73   Pregnancy, Urine - Urine, Clean Catch    Collection Time: 10/31/24  2:39 PM    Specimen: Urine, Clean Catch   Result Value Ref Range    HCG, Urine QL Negative Negative   CBC Auto Differential    Collection Time: 10/31/24  2:39 PM    Specimen: Blood   Result Value Ref Range    WBC 6.48 3.40 - 10.80 10*3/mm3    RBC 4.01 3.77 - 5.28 10*6/mm3    Hemoglobin 13.9 12.0  - 15.9 g/dL    Hematocrit 39.9 34.0 - 46.6 %    MCV 99.5 (H) 79.0 - 97.0 fL    MCH 34.7 (H) 26.6 - 33.0 pg    MCHC 34.8 31.5 - 35.7 g/dL    RDW 11.6 (L) 12.3 - 15.4 %    RDW-SD 43.1 37.0 - 54.0 fl    MPV 9.0 6.0 - 12.0 fL    Platelets 346 140 - 450 10*3/mm3    Neutrophil % 59.2 42.7 - 76.0 %    Lymphocyte % 29.9 19.6 - 45.3 %    Monocyte % 6.5 5.0 - 12.0 %    Eosinophil % 4.2 0.3 - 6.2 %    Basophil % 0.2 0.0 - 1.5 %    Immature Grans % 0.0 0.0 - 0.5 %    Neutrophils, Absolute 3.84 1.70 - 7.00 10*3/mm3    Lymphocytes, Absolute 1.94 0.70 - 3.10 10*3/mm3    Monocytes, Absolute 0.42 0.10 - 0.90 10*3/mm3    Eosinophils, Absolute 0.27 0.00 - 0.40 10*3/mm3    Basophils, Absolute 0.01 0.00 - 0.20 10*3/mm3    Immature Grans, Absolute 0.00 0.00 - 0.05 10*3/mm3   POC Chem Panel    Collection Time: 10/31/24  3:02 PM    Specimen: Venous Blood   Result Value Ref Range    Glucose 95 74 - 100 mg/dL    Sodium 141 138 - 146 mmol/L    POC Potassium 2.9 (L) 3.5 - 4.5 mmol/L    Ionized Calcium 1.16 1.15 - 1.33 mmol/L    Chloride 104 98 - 107 mmol/L    Creatinine 0.51 0.51 - 1.19 mg/dL    BUN 8 8 - 26 mg/dL    CO2 Content 28.6 (H) 23 - 27 mmol/L   ECG 12 Lead QT Measurement    Collection Time: 10/31/24  3:38 PM   Result Value Ref Range    QT Interval 418 ms    QTC Interval 448 ms       Ordered the above labs and independently interpreted results.  My findings will be discussed in the ED course or medical decision making section below    RADIOLOGY RESULTS  CT Angiogram Head, CT Angiogram Neck    Result Date: 10/31/2024  EMERGENCY CONTRAST-ENHANCED CT ANGIOGRAM OF THE HEAD AND NECK ON 10/31/2024  CLINICAL HISTORY: This is a 34-year-old female patient with right eye blurred vision and pressure behind right eye  TECHNIQUE: Spiral CT images were obtained from the base of the skull to the vertex both pre and post intravenous contrast. These images were reformatted and are submitted in 3 mm thick axial, sagittal and coronal CT sections with  brain algorithm. Additional spiral CT angio images were obtained from the top of the aortic arch up through the great vessels of the head and neck during the arterial phase of contrast and the images were reformatted and submitted in 1 mm thick axial, sagittal and coronal CT sections with soft tissue algorithm and 3D reconstructions were performed to complete the CT angiogram of the head and neck  COMPARISON: There are no prior studies from [facility] for comparison.  FINDINGS: HEAD CT: The brain parenchyma is normal in attenuation. The ventricles are normal in size. I see no focal mass effect. There is no midline shift. No extra axial fluid collections are identified. There is no evidence of acute intracranial hemorrhage. Following contrast, no abnormal areas of enhancement are seen in the head. The calvarium and the skull base are normal in appearance. There is a 10 x 5 mm mucous retention cyst in the posterior lateral right maxillary sinus. Otherwise, the paranasal sinuses and the mastoid air cells and the middle ear cavities are clear. The orbits are unremarkable.  CT ANGIOGRAM OF THE NECK: The nasopharynx, oropharynx, the hypopharynx, the true cords and the subglottic airway are normal in appearance. The thyroid gland enhances homogeneously and is normal in appearance. The lung apices are clear. The parotid, , parapharyngeal and submandibular spaces are symmetric and are normal in appearance. At C5-6 there is mild disc space narrowing, minimal degenerative endplate changes, posterior spurring, posterior central disc protrusion abuts the ventral surface of the cord mildly narrowing the canal. Otherwise the cervical spine is unremarkable. There is anatomic origin of the great vessels off the aortic arch. The left subclavian artery origin is normal in appearance. No stenosis is seen in the left subclavian artery. There is beam-hardening artifact streaking through the left vertebral  artery origin and proximal left vertebral artery that limits evaluation. The cervical segment of the left vertebral artery is normal in appearance. The left common carotid origin and proximal left common carotid arteries obscured by beam hardening artifact of the densely opacified adjacent left brachiocephalic vein. Beyond this the mid and distal left common carotid artery and left common carotid bifurcation is normal in appearance and I see no stenosis in the cervical segment of the left internal carotid artery using the NASCET criteria. The brachiocephalic artery origin is normal appearance. Its bifurcation into the right subclavian and common carotid arteries is normal in appearance. The right subclavian artery is normal in appearance. The right vertebral artery origin is normal in appearance. No stenosis is seen in the cervical segment of the right vertebral artery. The right common carotid origin is normal in appearance. No stenosis is seen in the right common carotid artery and its bifurcation into the right internal and external carotid arteries is normal in appearance and I see no stenosis in the cervical segment of the right internal carotid artery using the NASCET criteria.  CT ANGIOGRAM OF THE HEAD: The intracranial segments of the distal vertebral arteries are widely patent to the vertebrobasilar junction without stenosis. The basilar artery and the basilar tip is normal in appearance. The posterior cerebral and superior cerebellar arteries are normal in appearance. The upper cervical, petrous and cavernous and supracavernous segments of the internal carotid arteries are normal in appearance. The A1 segments of the anterior cerebral arteries and the anterior communicating artery origin and the A2 segments of the anterior cerebral arteries are normal in appearance. The M1 segments of the middle cerebral arteries and the middle cerebral artery bifurcations are normal in appearance.       1. The CT of the  head is within normal limits  2. Mild cervical spondylosis is present. At C5-6 there is mild posterior spurring, there is posterior central disc protrusion abuts the anterior midline of the cord mildly narrowing the central portion of the canal otherwise the cervical spine is unremarkable.  3. The CT angiographic evaluation of the great vessels of the head and neck is within normal limits. The etiology of this patient's right eye blurred vision and pressure behind right eye is not established on this exam.  Radiation dose reduction techniques were utilized, including automated exposure control and exposure modulation based on body size.         Ordered the above noted radiological studies.  Independently interpreted by me.  My findings will be discussed in the medical decision section below.     PROGRESS, DATA ANALYSIS, CONSULTS AND MEDICAL DECISION MAKING    Please note that this section constitutes my independent interpretation of clinical data including lab results, radiology, EKG's.  This constitutes my independent professional opinion regarding differential diagnosis and management of this patient.  It may include any factors such as history from outside sources, review of external records, social determinants of health, management of medications, response to those treatments, and discussions with other providers.    ED Course as of 10/31/24 1716   Thu Oct 31, 2024   1429 Discussed pt's presentation with Dr. Scruggs, Stroke neurologist. He would like CTA head/neck and migraine cocktail.  If  normal and improved after meds can d/c home, if abnormal or pt concerned can admit to OBS.  I have updated the patient [EW]   1445 Visual acuity:  OD: 20/40  OS:  20/25  Both: 20/20 [EW]   1524 Potassium(!): 2.9  Oral replacement ordered here.  [EW]   1540 EKG          EKG time: 1538  Rhythm/Rate: 69, sinus rhythm  P waves and TN: normal TN, normal JOHN  QRS, axis: normal QRS and axis  ST and T waves: no acute ST/T wave  abnormalities.     Interpreted Contemporaneously by me, independently viewed  Compared with prior dated 11/11/2019   [EW]   1559 Discussed CTA's with Dr. Cagle, no acute findings.  [EW]   1609 Pt states the left eye blurry vision slightly improved.  Headache improved.  Does have h/o headaches just has never had this type of headache before.  [EW]      ED Course User Index  [EW] Haylee Ibrahim APRN     Orders placed during this visit:  Orders Placed This Encounter   Procedures    CT Angiogram Head    CT Angiogram Neck    Comprehensive Metabolic Panel    Pregnancy, Urine - Urine, Clean Catch    CBC Auto Differential    Visual acuity screening    POC Chem Panel    ECG 12 Lead QT Measurement    Blood Draw With IV Start    Insert peripheral IV    CBC & Differential    ED Acknowledgement Form Needed;            Medical Decision Making  Pt presents with right eye blurry vision and pressure behind the eye that started on Saturday.  Pt had recent breast implant revision on Friday and was sent home with Scopolamine patch behind the right ear.  This could certainly cause some blurry vision but the patch had been off since Saturday evening or Sunday am.  She has a normal, non-focal neurological exam.  Vision intact.  Does have h/o headaches and I considered this could be atypical or ocular migraine.  Also considered this could be TIA/stroke.  Discussed pt's timing, presentation with Dr. Scruggs, stroke neurology.  He recommends CTA head/neck and these results normal.  Pt improved with migraine cocktail.  Did give small amount Toradol at end (lower dose due to recent breast surgery).  We had shared decision making regarding OBS admission vs going home and she felt comfortable going home.  She was given strong RTER precautions.         DIAGNOSIS  Final diagnoses:   Atypical migraine   Blurry vision, right eye          Medication List      No changes were made to your prescriptions during this visit.         FOLLOW-UP  Everardo  Juan Jose NULL MD  [address]  [facility] [address]  [phone]    Schedule an appointment as soon as possible for a visit in 1 day  As needed, If symptoms worsen        Latest Documented Vital Signs:  As of 17:16 EDT  BP- 102/73 HR- 71 Temp- 98.2 °F (36.8 °C) (Oral) O2 sat- 98%    Appropriate PPE utilized throughout this patient encounter to include mask, if indicated, per current protocol. Hand hygiene was performed before donning PPE and after removal when leaving the room.    Please note that portions of this were completed with a voice recognition program.     Note Disclaimer: At [facility], we believe that sharing information builds trust and better relationships. You are receiving this note because you are receiving care at [facility] or recently visited. It is possible you will see health information before a provider has talked with you about it. This kind of information can be easy to misunderstand. To help you fully understand what it means for your health, we urge you to discuss this note with your provider.

## 2024-10-31 NOTE — DISCHARGE INSTRUCTIONS
For headache can take Tylenol plus Benadryl.    Follow up with your Primary Care Provider    For the breast rash, use over the counter hydrocortisone ointment but  not directly on the incision.    Return Precautions    Although you are being discharged from the ED today, I encourage you to return for worsening symptoms.  Things can, and do, change such that treatment at home with medication may not be adequate.      Specifically, return for any of the following:    Chest pain, shortness of breath, pain or nausea and vomiting not controlled by medications provided.    Please make a follow up with your Primary Care Provider for a blood pressure recheck.

## 2025-05-07 ENCOUNTER — OFFICE VISIT (OUTPATIENT)
Dept: INTERNAL MEDICINE | Facility: CLINIC | Age: 35
End: 2025-05-07
Payer: COMMERCIAL

## 2025-05-07 VITALS
HEART RATE: 84 BPM | TEMPERATURE: 98 F | WEIGHT: 99.7 LBS | HEIGHT: 62 IN | OXYGEN SATURATION: 100 % | RESPIRATION RATE: 14 BRPM | SYSTOLIC BLOOD PRESSURE: 110 MMHG | DIASTOLIC BLOOD PRESSURE: 74 MMHG | BODY MASS INDEX: 18.35 KG/M2

## 2025-05-07 DIAGNOSIS — B34.9 VIRAL ILLNESS: Primary | ICD-10-CM

## 2025-05-07 DIAGNOSIS — R68.89 FLU-LIKE SYMPTOMS: ICD-10-CM

## 2025-05-07 PROCEDURE — 99214 OFFICE O/P EST MOD 30 MIN: CPT | Performed by: FAMILY MEDICINE

## 2025-05-08 LAB
ALBUMIN SERPL-MCNC: 4.9 G/DL (ref 3.9–4.9)
ALP SERPL-CCNC: 79 IU/L (ref 44–121)
ALT SERPL-CCNC: 30 IU/L (ref 0–32)
AST SERPL-CCNC: 27 IU/L (ref 0–40)
BASOPHILS # BLD AUTO: 0 X10E3/UL (ref 0–0.2)
BASOPHILS NFR BLD AUTO: 0 %
BILIRUB SERPL-MCNC: <0.2 MG/DL (ref 0–1.2)
BUN SERPL-MCNC: 8 MG/DL (ref 6–20)
BUN/CREAT SERPL: 15 (ref 9–23)
CALCIUM SERPL-MCNC: 10 MG/DL (ref 8.7–10.2)
CHLORIDE SERPL-SCNC: 100 MMOL/L (ref 96–106)
CO2 SERPL-SCNC: 23 MMOL/L (ref 20–29)
CREAT SERPL-MCNC: 0.52 MG/DL (ref 0.57–1)
CRP SERPL-MCNC: 11 MG/L (ref 0–10)
EGFRCR SERPLBLD CKD-EPI 2021: 125 ML/MIN/1.73
EOSINOPHIL # BLD AUTO: 0.1 X10E3/UL (ref 0–0.4)
EOSINOPHIL NFR BLD AUTO: 1 %
ERYTHROCYTE [DISTWIDTH] IN BLOOD BY AUTOMATED COUNT: 11.6 % (ref 11.7–15.4)
ERYTHROCYTE [SEDIMENTATION RATE] IN BLOOD BY WESTERGREN METHOD: 2 MM/HR (ref 0–32)
GLOBULIN SER CALC-MCNC: 2.3 G/DL (ref 1.5–4.5)
GLUCOSE SERPL-MCNC: 80 MG/DL (ref 70–99)
HCT VFR BLD AUTO: 46.8 % (ref 34–46.6)
HGB BLD-MCNC: 15.8 G/DL (ref 11.1–15.9)
IMM GRANULOCYTES # BLD AUTO: 0 X10E3/UL (ref 0–0.1)
IMM GRANULOCYTES NFR BLD AUTO: 0 %
LYMPHOCYTES # BLD AUTO: 2.5 X10E3/UL (ref 0.7–3.1)
LYMPHOCYTES NFR BLD AUTO: 35 %
MCH RBC QN AUTO: 34.6 PG (ref 26.6–33)
MCHC RBC AUTO-ENTMCNC: 33.8 G/DL (ref 31.5–35.7)
MCV RBC AUTO: 102 FL (ref 79–97)
MONOCYTES # BLD AUTO: 0.4 X10E3/UL (ref 0.1–0.9)
MONOCYTES NFR BLD AUTO: 6 %
NEUTROPHILS # BLD AUTO: 4 X10E3/UL (ref 1.4–7)
NEUTROPHILS NFR BLD AUTO: 58 %
PLATELET # BLD AUTO: 303 X10E3/UL (ref 150–450)
POTASSIUM SERPL-SCNC: 4.3 MMOL/L (ref 3.5–5.2)
PROT SERPL-MCNC: 7.2 G/DL (ref 6–8.5)
RBC # BLD AUTO: 4.57 X10E6/UL (ref 3.77–5.28)
SODIUM SERPL-SCNC: 140 MMOL/L (ref 134–144)
WBC # BLD AUTO: 7 X10E3/UL (ref 3.4–10.8)

## 2025-05-10 NOTE — PROGRESS NOTES
"Chief Complaint  Rash and Flu Symptoms (Flu like sx nausea dizziness 5/2/25 [facility] ruled out Covid Flu and Strep )    Subjective          Dina Chacon presents to [facility]  History of Present Illness  The patient came in to get checked out for what seems to be a viral illness. She mentioned that last Friday, after doing yard work for a few hours, she suddenly felt really bad, like she had the flu. She had chills, a mild fever of 99 degrees, headache, dizziness, and nausea, which kept her in bed all weekend. On Sunday, she went to a clinic and ruled out flu, COVID-19, and strep with tests that came back negative. On Monday, she noticed some red bumps that looked like petechiae, different from the hives she usually gets. These bumps went away by midday but came back later. She also mentioned having some swelling and soreness on her left side, which has happened before. Her Raynaud's disease has been worse since these symptoms started. She isn't in pain or running a fever right now but is having trouble moving around because of dizziness and just feeling off. She had mononucleosis when she was a child.    Objective   Vital Signs:   /74 (BP Location: Left arm, Patient Position: Sitting)   Pulse 84   Temp 98 °F (36.7 °C) (Oral)   Resp 14   Ht 157.5 cm (62.01\")   Wt 45.2 kg (99 lb 11.2 oz)   SpO2 100%   BMI 18.23 kg/m²     Physical Exam  Vitals and nursing note reviewed.   Constitutional:       Appearance: She is well-developed.   HENT:      Head: Normocephalic and atraumatic.   Musculoskeletal:      Cervical back: Normal range of motion and neck supple.   Neurological:      Mental Status: She is alert and oriented to person, place, and time.   Psychiatric:         Behavior: Behavior normal.         Physical Exam  Skin: Petechiae noted     Result Review :                 Assessment and Plan    Diagnoses and all orders for this visit:    1. Viral illness " (Primary)  -     CBC & Differential  -     Comprehensive Metabolic Panel  -     C-reactive Protein  -     Sedimentation Rate    2. Flu-like symptoms    Other orders  -     CBC & Differential  -     Comprehensive Metabolic Panel      Assessment & Plan  1. Viral illness.  - Symptoms include low-grade fever, headache, dizziness, nausea, and petechiae-type red bumps.  - Influenza, COVID-19, and strep have been ruled out.  - A complete blood count (CBC), comprehensive metabolic panel (CMP), and inflammatory markers will be ordered today to ensure no other underlying conditions are present.  - Follow-up in a couple of weeks for a physical examination and more thorough blood work. If symptoms persist, further diagnostic workup will be considered.  I spent 30 minutes caring for Dina on this date of service. This time includes time spent by me in the following activities:preparing for the visit, reviewing tests, obtaining and/or reviewing a separately obtained history, performing a medically appropriate examination and/or evaluation , counseling and educating the patient/family/caregiver, ordering medications, tests, or procedures, referring and communicating with other health care professionals , documenting information in the medical record, and care coordination  Follow Up   No follow-ups on file.  Patient was given instructions and counseling regarding her condition or for health maintenance advice. Please see specific information pulled into the AVS if appropriate.           Patient or patient representative verbalized consent for the use of Ambient Listening during the visit with  Juan Jose Mcgee MD for chart documentation. 5/10/2025  11:25 EDT

## 2025-06-04 ENCOUNTER — OFFICE VISIT (OUTPATIENT)
Dept: INTERNAL MEDICINE | Facility: CLINIC | Age: 35
End: 2025-06-04
Payer: COMMERCIAL

## 2025-06-04 VITALS
SYSTOLIC BLOOD PRESSURE: 108 MMHG | HEIGHT: 62 IN | WEIGHT: 100.3 LBS | HEART RATE: 116 BPM | OXYGEN SATURATION: 99 % | TEMPERATURE: 98 F | DIASTOLIC BLOOD PRESSURE: 64 MMHG | RESPIRATION RATE: 14 BRPM | BODY MASS INDEX: 18.46 KG/M2

## 2025-06-04 DIAGNOSIS — Z00.00 HEALTHCARE MAINTENANCE: ICD-10-CM

## 2025-06-04 DIAGNOSIS — M51.369 BULGING LUMBAR DISC: ICD-10-CM

## 2025-06-04 DIAGNOSIS — E53.8 VITAMIN B 12 DEFICIENCY: ICD-10-CM

## 2025-06-04 DIAGNOSIS — Z30.9 ENCOUNTER FOR CONTRACEPTIVE MANAGEMENT, UNSPECIFIED TYPE: ICD-10-CM

## 2025-06-04 DIAGNOSIS — E53.8 VITAMIN B12 DEFICIENCY: ICD-10-CM

## 2025-06-04 DIAGNOSIS — Z00.00 WELL WOMAN EXAM (NO GYNECOLOGICAL EXAM): Primary | ICD-10-CM

## 2025-06-04 RX ORDER — CYANOCOBALAMIN 1000 UG/ML
1000 INJECTION, SOLUTION INTRAMUSCULAR; SUBCUTANEOUS
Qty: 3 ML | Refills: 6 | Status: SHIPPED | OUTPATIENT
Start: 2025-06-04

## 2025-06-04 RX ORDER — METHOCARBAMOL 500 MG/1
500 TABLET, FILM COATED ORAL 4 TIMES DAILY PRN
Qty: 120 TABLET | Refills: 3 | Status: SHIPPED | OUTPATIENT
Start: 2025-06-04

## 2025-06-04 RX ORDER — DROSPIRENONE AND ESTETROL 3-14.2(28)
1 KIT ORAL DAILY
Qty: 30 TABLET | Refills: 3 | Status: SHIPPED | OUTPATIENT
Start: 2025-06-04

## 2025-06-05 LAB
ALBUMIN SERPL-MCNC: 5 G/DL (ref 3.9–4.9)
ALBUMIN/CREAT UR: 45 MG/G CREAT (ref 0–29)
ALP SERPL-CCNC: 71 IU/L (ref 44–121)
ALT SERPL-CCNC: 25 IU/L (ref 0–32)
AST SERPL-CCNC: 29 IU/L (ref 0–40)
BASOPHILS # BLD AUTO: 0.1 X10E3/UL (ref 0–0.2)
BASOPHILS NFR BLD AUTO: 1 %
BILIRUB SERPL-MCNC: 0.4 MG/DL (ref 0–1.2)
BUN SERPL-MCNC: 10 MG/DL (ref 6–20)
BUN/CREAT SERPL: 15 (ref 9–23)
CALCIUM SERPL-MCNC: 9.5 MG/DL (ref 8.7–10.2)
CHLORIDE SERPL-SCNC: 101 MMOL/L (ref 96–106)
CHOLEST SERPL-MCNC: 185 MG/DL (ref 100–199)
CO2 SERPL-SCNC: 20 MMOL/L (ref 20–29)
CREAT SERPL-MCNC: 0.65 MG/DL (ref 0.57–1)
CREAT UR-MCNC: 117.3 MG/DL
EGFRCR SERPLBLD CKD-EPI 2021: 118 ML/MIN/1.73
EOSINOPHIL # BLD AUTO: 0.1 X10E3/UL (ref 0–0.4)
EOSINOPHIL NFR BLD AUTO: 1 %
ERYTHROCYTE [DISTWIDTH] IN BLOOD BY AUTOMATED COUNT: 12 % (ref 11.7–15.4)
FT4I SERPL CALC-MCNC: 2 (ref 1.2–4.9)
GLOBULIN SER CALC-MCNC: 2.1 G/DL (ref 1.5–4.5)
GLUCOSE SERPL-MCNC: 86 MG/DL (ref 70–99)
HBA1C MFR BLD: 5.1 % (ref 4.8–5.6)
HCT VFR BLD AUTO: 44.3 % (ref 34–46.6)
HDLC SERPL-MCNC: 73 MG/DL
HGB BLD-MCNC: 14.6 G/DL (ref 11.1–15.9)
IMM GRANULOCYTES # BLD AUTO: 0 X10E3/UL (ref 0–0.1)
IMM GRANULOCYTES NFR BLD AUTO: 0 %
LDLC SERPL CALC-MCNC: 101 MG/DL (ref 0–99)
LDLC/HDLC SERPL: 1.4 RATIO (ref 0–3.2)
LYMPHOCYTES # BLD AUTO: 2 X10E3/UL (ref 0.7–3.1)
LYMPHOCYTES NFR BLD AUTO: 29 %
MCH RBC QN AUTO: 34.5 PG (ref 26.6–33)
MCHC RBC AUTO-ENTMCNC: 33 G/DL (ref 31.5–35.7)
MCV RBC AUTO: 105 FL (ref 79–97)
MICROALBUMIN UR-MCNC: 52.6 UG/ML
MONOCYTES # BLD AUTO: 0.4 X10E3/UL (ref 0.1–0.9)
MONOCYTES NFR BLD AUTO: 5 %
NEUTROPHILS # BLD AUTO: 4.5 X10E3/UL (ref 1.4–7)
NEUTROPHILS NFR BLD AUTO: 64 %
PLATELET # BLD AUTO: 326 X10E3/UL (ref 150–450)
POTASSIUM SERPL-SCNC: 4.4 MMOL/L (ref 3.5–5.2)
PROT SERPL-MCNC: 7.1 G/DL (ref 6–8.5)
RBC # BLD AUTO: 4.23 X10E6/UL (ref 3.77–5.28)
SODIUM SERPL-SCNC: 137 MMOL/L (ref 134–144)
T3RU NFR SERPL: 24 % (ref 24–39)
T4 SERPL-MCNC: 8.4 UG/DL (ref 4.5–12)
TRIGL SERPL-MCNC: 56 MG/DL (ref 0–149)
TSH SERPL DL<=0.005 MIU/L-ACNC: 1.61 UIU/ML (ref 0.45–4.5)
VIT B12 SERPL-MCNC: 181 PG/ML (ref 232–1245)
VLDLC SERPL CALC-MCNC: 11 MG/DL (ref 5–40)
WBC # BLD AUTO: 7 X10E3/UL (ref 3.4–10.8)

## 2025-06-05 NOTE — PROGRESS NOTES
Subjective   Dina Chacon is a 34 y.o. female and is here for a comprehensive physical exam. The patient reports no problems.    Pt is UTD with annual gyn exam and mammo           Social History:   Social History     Socioeconomic History    Marital status:    Tobacco Use    Smoking status: Some Days     Current packs/day: 0.50     Types: Cigarettes    Smokeless tobacco: Never   Vaping Use    Vaping status: Never Used   Substance and Sexual Activity    Alcohol use: Yes     Comment: social drinker    Drug use: No    Sexual activity: Yes     Partners: Male     Birth control/protection: None       Family History:   Family History   Problem Relation Age of Onset    Multiple sclerosis Other     Malig Hyperthermia Neg Hx        Past Medical History:   Past Medical History:   Diagnosis Date    ADHD (attention deficit hyperactivity disorder)     Allergic     GERD (gastroesophageal reflux disease) 02/03/2020    Head ache     Injury of back     Low back pain     Numbness and tingling     right leg and foot    PONV (postoperative nausea and vomiting)     Right leg pain        The following portions of the patient's history were reviewed and updated as appropriate: allergies, current medications, past family history, past medical history, past social history, past surgical history and problem list.    Review of Systems    Review of Systems   Constitutional:  Negative for chills and fever.   HENT:  Negative for congestion, rhinorrhea, sinus pain and sore throat.    Eyes:  Negative for photophobia and visual disturbance.   Respiratory:  Negative for cough, chest tightness and shortness of breath.    Cardiovascular:  Negative for chest pain and palpitations.   Gastrointestinal:  Negative for diarrhea, nausea and vomiting.   Genitourinary:  Negative for dysuria, frequency and urgency.   Skin:  Negative for rash and wound.   Neurological:  Negative for dizziness and syncope.   Psychiatric/Behavioral:  Negative for  behavioral problems and confusion.        Objective   Physical Exam  Vitals and nursing note reviewed.   Constitutional:       Appearance: She is well-developed.   HENT:      Head: Normocephalic and atraumatic.      Right Ear: External ear normal.      Left Ear: External ear normal.   Cardiovascular:      Rate and Rhythm: Normal rate and regular rhythm.      Heart sounds: Normal heart sounds.   Pulmonary:      Effort: Pulmonary effort is normal. No respiratory distress.      Breath sounds: Normal breath sounds.   Abdominal:      Palpations: Abdomen is soft.      Tenderness: There is no abdominal tenderness. There is no guarding.   Musculoskeletal:         General: Normal range of motion.      Cervical back: Normal range of motion and neck supple.   Lymphadenopathy:      Cervical: No cervical adenopathy.   Skin:     General: Skin is warm.   Neurological:      Mental Status: She is alert and oriented to person, place, and time.   Psychiatric:         Behavior: Behavior normal.         Vitals:    06/04/25 1100   BP: 108/64   Pulse: 116   Resp: 14   Temp: 98 °F (36.7 °C)   SpO2: 99%     Body mass index is 18.34 kg/m².      Medications:   Current Outpatient Medications:     ACZONE 7.5 % gel, , Disp: , Rfl:     ADDERALL XR 20 MG 24 hr capsule, Take 1 capsule by mouth Daily, Disp: , Rfl:     amphetamine-dextroamphetamine (ADDERALL) 10 MG tablet, Take 1 tablet by mouth Daily., Disp: , Rfl:     cyanocobalamin 1000 MCG/ML injection, Inject 1 mL into the appropriate muscle as directed by prescriber Every 28 (Twenty-Eight) Days., Disp: 3 mL, Rfl: 6    methocarbamol (ROBAXIN) 500 MG tablet, Take 1 tablet by mouth 4 (Four) Times a Day As Needed for Muscle Spasms., Disp: 120 tablet, Rfl: 3    Drospirenone-Estetrol (Nextstellis) 3-14.2 MG tablet, Take 1 tablet by mouth Daily., Disp: 30 tablet, Rfl: 3    Current Facility-Administered Medications:     cyanocobalamin injection 1,000 mcg, 1,000 mcg, Intramuscular, Q28 Days, Everardo  Juan Jose NULL MD, 1,000 mcg at 02/21/19 1151       Assessment & Plan   Healthy female exam.      1. Healthcare Maintenance:  2. Patient Counseling:  --Nutrition: Stressed importance of moderation in sodium/caffeine intake, saturated fat and cholesterol, caloric balance, sufficient intake of fresh fruits, vegetables, fiber, calcium and vit D  --Exercise: Recommended 30 minutes of exercise daily.  --Immunizations reviewed.  --Discussed benefits of screening colonoscopy.    Diagnoses and all orders for this visit:    Well woman exam (no gynecological exam)    Healthcare maintenance  -     CBC & Differential  -     Comprehensive Metabolic Panel  -     Hemoglobin A1c  -     Thyroid Panel With TSH  -     Lipid Panel With LDL / HDL Ratio  -     Ambulatory Referral to Gynecology    Vitamin B12 deficiency  -     Vitamin B12  -     Microalbumin / Creatinine Urine Ratio - Urine, Clean Catch    Bulging lumbar disc  -     methocarbamol (ROBAXIN) 500 MG tablet; Take 1 tablet by mouth 4 (Four) Times a Day As Needed for Muscle Spasms.    Vitamin B 12 deficiency  -     cyanocobalamin 1000 MCG/ML injection; Inject 1 mL into the appropriate muscle as directed by prescriber Every 28 (Twenty-Eight) Days.    Encounter for contraceptive management, unspecified type  -     Drospirenone-Estetrol (Nextstellis) 3-14.2 MG tablet; Take 1 tablet by mouth Daily.        No follow-ups on file.             Dictated utilizing Dragon Voice Recognition Software

## 2025-06-05 NOTE — PROGRESS NOTES
"Chief Complaint  Annual Exam    Subjective          Dina Chacon presents to [facility]  History of Present Illness  The patient, Dina Chacon, is here for a physical exam. She reports no current health issues except for occasional back pain, which she manages with Robaxin. She finds this medication effective and usually cuts the pills in half, making the prescription last over a year. She requests a refill of her Robaxin prescription.    She hasn't been taking B12 supplements and is interested in starting B12 shots at home again, as she hasn't kept up with them when done in the office.    She is not up-to-date with her Pap smear and is thinking about switching her gynecologist. She has previously seen Dr. Gutierrez and another provider who delivered her third child.    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 She plans to travel internationally in 3 weeks, visiting 7 different countries over 16 days.    She wants to start low-dose birth control to try to skip her period during her upcoming trip, as she finds packing period items inconvenient. She is not currently on any form of birth control.    Objective   Vital Signs:   /64 (BP Location: Left arm, Patient Position: Sitting)   Pulse 116   Temp 98 °F (36.7 °C) (Oral)   Resp 14   Ht 157.5 cm (62.01\")   Wt 45.5 kg (100 lb 4.8 oz)   SpO2 99%   BMI 18.34 kg/m²     Physical Exam  Vitals and nursing note reviewed.   Constitutional:       Appearance: She is well-developed.   HENT:      Head: Normocephalic and atraumatic.   Musculoskeletal:      Cervical back: Normal range of motion and neck supple.   Neurological:      Mental Status: She is alert and oriented to person, place, and time.   Psychiatric:         Behavior: Behavior normal.         Physical Exam       Result Review :                 Assessment and Plan    Diagnoses and all orders for this visit:    1. Well woman " exam (no gynecological exam) (Primary)    2. Healthcare maintenance  -     CBC & Differential  -     Comprehensive Metabolic Panel  -     Hemoglobin A1c  -     Thyroid Panel With TSH  -     Lipid Panel With LDL / HDL Ratio  -     Ambulatory Referral to Gynecology    3. Vitamin B12 deficiency  -     Vitamin B12  -     Microalbumin / Creatinine Urine Ratio - Urine, Clean Catch    4. Bulging lumbar disc  -     methocarbamol (ROBAXIN) 500 MG tablet; Take 1 tablet by mouth 4 (Four) Times a Day As Needed for Muscle Spasms.  Dispense: 120 tablet; Refill: 3    5. Vitamin B 12 deficiency  -     cyanocobalamin 1000 MCG/ML injection; Inject 1 mL into the appropriate muscle as directed by prescriber Every 28 (Twenty-Eight) Days.  Dispense: 3 mL; Refill: 6    6. Encounter for contraceptive management, unspecified type  -     Drospirenone-Estetrol (Nextstellis) 3-14.2 MG tablet; Take 1 tablet by mouth Daily.  Dispense: 30 tablet; Refill: 3      Assessment & Plan  1. Back pain.  - Reports intermittent back pain with significant improvement compared to previous years.  - Finds Robaxin (methocarbamol) effective and usually cuts the tablets in half, making one prescription last over a year.  - Discussed the effectiveness of the current medication regimen.  - Prescription for Robaxin will be sent to the pharmacy.    2. Vitamin B12 deficiency.  - Has not been taking B12 supplements and mentioned previous issues with maintaining B12 levels.  - Recent blood work from 3 weeks ago indicated elevated levels due to a recent illness.  - Discussed the option of administering B12 shots at home again.  - B12 injections will be ordered for home administration.    3. Health maintenance.  - Not up to date with her Pap smear and is considering switching gynecologists.  - Discussed the need for a Pap smear and the importance of regular gynecological exams.  - A referral to a gynecologist will be made to ensure she gets her Pap smear done.    4.  Immunizations.  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  - Advised to get the Tdap vaccine before her upcoming trip in 3 weeks.  - Discussed the importance of immunizations for her health and safety.  - Tdap vaccine will be administered before her trip.    5. Contraception.  - Requested a low-dose birth control pill to skip her period during her upcoming backpacking trip.  - Discussed the use of Nextstellis to manage her menstrual cycle.  - Prescription for Nextstellis will be provided.  - Advised to start the medication immediately to achieve the desired effect.    Follow Up   No follow-ups on file.  Patient was given instructions and counseling regarding her condition or for health maintenance advice. Please see specific information pulled into the AVS if appropriate.           Patient or patient representative verbalized consent for the use of Ambient Listening during the visit with  Juan Jose Mcgee MD for chart documentation. 6/5/2025  07:54 EDT

## 2025-06-06 ENCOUNTER — TELEPHONE (OUTPATIENT)
Dept: INTERNAL MEDICINE | Facility: CLINIC | Age: 35
End: 2025-06-06
Payer: COMMERCIAL

## (undated) DEVICE — DRP MICROSCOPE 4 BINOCULAR CV 54X150IN

## (undated) DEVICE — SMOKE EVACUATION TUBING WITH 7/8 IN TO 1/4 IN REDUCER: Brand: BUFFALO FILTER

## (undated) DEVICE — CONN TBG Y 5 IN 1 LF STRL

## (undated) DEVICE — ADHS SKIN DERMABOND TOP ADVANCED

## (undated) DEVICE — SOL NACL 0.9PCT 100ML SGL

## (undated) DEVICE — PK NEURO SPINE 40

## (undated) DEVICE — UNDYED BRAIDED (POLYGLACTIN 910), SYNTHETIC ABSORBABLE SUTURE: Brand: COATED VICRYL

## (undated) DEVICE — SPNG GZ STRL 2S 4X4 12PLY

## (undated) DEVICE — GLV SURG SENSICARE W/ALOE PF LF 7.5 STRL

## (undated) DEVICE — NDL SPINE 20G 3 1/2 YEL STRL 1P/U

## (undated) DEVICE — APPL CHLORAPREP W/TINT 26ML ORNG

## (undated) DEVICE — SPNG GZ WOVN 4X4IN 12PLY 10/BX STRL

## (undated) DEVICE — ANTIBACTERIAL UNDYED BRAIDED (POLYGLACTIN 910), SYNTHETIC ABSORBABLE SUTURE: Brand: COATED VICRYL

## (undated) DEVICE — AIRLIFE™ UNIVERSAL OXYGEN NUT AND NIPPLE CONNECTION: Brand: AIRLIFE™

## (undated) DEVICE — SYR CONTRL LUERLOK 10CC

## (undated) DEVICE — GLV SURG BIOGEL LTX PF 7

## (undated) DEVICE — 6.0MM PRECISION ROUND

## (undated) DEVICE — Device

## (undated) DEVICE — CODMAN® SURGICAL PATTIES 3/4" X 3/4" (1.91CM X 1.91CM): Brand: CODMAN®